# Patient Record
Sex: FEMALE | Race: WHITE | NOT HISPANIC OR LATINO | Employment: OTHER | URBAN - NONMETROPOLITAN AREA
[De-identification: names, ages, dates, MRNs, and addresses within clinical notes are randomized per-mention and may not be internally consistent; named-entity substitution may affect disease eponyms.]

---

## 2017-01-03 ENCOUNTER — TRANSCRIBE ORDERS (OUTPATIENT)
Dept: ADMINISTRATIVE | Facility: HOSPITAL | Age: 57
End: 2017-01-03

## 2017-01-03 DIAGNOSIS — Z12.31 VISIT FOR SCREENING MAMMOGRAM: Primary | ICD-10-CM

## 2017-01-24 ENCOUNTER — HOSPITAL ENCOUNTER (OUTPATIENT)
Dept: MAMMOGRAPHY | Facility: HOSPITAL | Age: 57
Discharge: HOME | End: 2017-01-24
Admitting: PHYSICIAN ASSISTANT

## 2017-01-24 DIAGNOSIS — Z12.31 VISIT FOR SCREENING MAMMOGRAM: ICD-10-CM

## 2017-01-24 PROCEDURE — 77063 BREAST TOMOSYNTHESIS BI: CPT | Performed by: RADIOLOGY

## 2017-01-24 PROCEDURE — G0202 SCR MAMMO BI INCL CAD: HCPCS

## 2017-01-24 PROCEDURE — 77063 BREAST TOMOSYNTHESIS BI: CPT

## 2017-01-24 PROCEDURE — G0202 SCR MAMMO BI INCL CAD: HCPCS | Performed by: RADIOLOGY

## 2017-04-06 RX ORDER — PANTOPRAZOLE SODIUM 40 MG/1
40 TABLET, DELAYED RELEASE ORAL DAILY
COMMUNITY

## 2017-04-06 RX ORDER — MULTIVIT WITH MINERALS/LUTEIN
250 TABLET ORAL DAILY
COMMUNITY

## 2017-04-06 RX ORDER — ERGOCALCIFEROL (VITAMIN D2) 10 MCG
400 TABLET ORAL DAILY
COMMUNITY

## 2017-04-10 ENCOUNTER — OFFICE VISIT (OUTPATIENT)
Dept: SURGERY | Facility: CLINIC | Age: 57
End: 2017-04-10

## 2017-04-10 VITALS
HEART RATE: 85 BPM | BODY MASS INDEX: 43.32 KG/M2 | WEIGHT: 276 LBS | DIASTOLIC BLOOD PRESSURE: 90 MMHG | SYSTOLIC BLOOD PRESSURE: 160 MMHG | HEIGHT: 67 IN

## 2017-04-10 DIAGNOSIS — R22.9 SUBCUTANEOUS MASS: Primary | ICD-10-CM

## 2017-04-10 PROCEDURE — 99202 OFFICE O/P NEW SF 15 MIN: CPT | Performed by: SURGERY

## 2017-04-10 NOTE — PROGRESS NOTES
"Subjective   Lauren Klein is a 56 y.o. female here today for two places on back of left leg referred by Mare Cool.    History of Present Illness  Ms Klein was seen in the office today for evaluation of 2 subcutaneous masses on the left lower leg.  There is a posterior one which has be present for at least 2 years.  The patient saw Dr. Patino for this in the past and he told her not to be concerned unless it start to grow.  There is a larger anterior medial mass which the patient states has grown fairly rapidly.  She states it hurts at times, especially at night. She had a CT of the lower extremity which did not demonstrate the masses at all.  No Known Allergies  Current Outpatient Prescriptions   Medication Sig Dispense Refill   • pantoprazole (PROTONIX) 40 MG EC tablet Take 40 mg by mouth Daily.     • vitamin C (ASCORBIC ACID) 250 MG tablet Take 250 mg by mouth Daily.     • Vitamin D, Cholecalciferol, (CHOLECALCIFEROL) 400 UNITS tablet Take 400 Units by mouth Daily.       No current facility-administered medications for this visit.      Past Medical History:   Diagnosis Date   • Colitis      Past Surgical History:   Procedure Laterality Date   • CHOLECYSTECTOMY     • HYSTERECTOMY      age 50   • TONSILLECTOMY       Review of Systems  General: negative  Integumentary: lump  Eyes: eyes itch  ENT: negative  Respiratory: negative  Gastrointestinal: negative  Cardiovascular: negative  Neurological: negative  Psychiatric: negative  Hematologic/Lymphatic: negative  Genitourinary: negative  Musculoskeletal: sore muscles  Endocrine: hot flashes  Breasts: negative        Objective   /90 (BP Location: Left arm, Patient Position: Sitting)  Pulse 85  Ht 67\" (170.2 cm)  Wt 276 lb (125 kg)  BMI 43.23 kg/m2  Physical Exam  On examination this is an overweight white female in no acute distress  HEENT examination: Within normal limits.  Conjunctiva pink.  Nose and ears appear normal.  Neck: Supple, full range of " motion.  No JVD.  Musculoskeletal: Full range of motion all extremities without focal weakness. Normal gait. No digital cyanosis.  Psych: Patient is alert, oriented x3. Mood and affect are appropriate.  Skin:  On inspection of the lower extremities there is asymmetry of the left lower leg with respect to the right.  In the mid posterior left lower leg there is a 4-5 cm mass consistent with a lipoma.  The anterior mass is visible anteriorly starts just medial to the most anterior surface of the tibia.  The inferior border is visible however the posterior lateral extent is not palpable at all.  The mass could be 15 cm but could be more or less.  The vertical distance is fairly well-defined is 9.5 cm.  Results/Data  CT report was reviewed and interestingly does not even describe any asymmetry.  Procedures       Assessment/Plan     Soft tissue mass left lower extremity, the borders of which are not clearly delineated on examination    Plan:  Will obtain CD disc from [facility] and review.               Discussion/Summary    Errors in dictation may reflect use of voice recognition software and not all errors in transcription may have been detected prior to signing.    Future Appointments  Date Time Provider Department Center   4/10/2017 2:20 PM Janine Menon MD [facility] None

## 2017-05-04 DIAGNOSIS — R22.42 MASS OF LEG, LEFT: Primary | ICD-10-CM

## 2017-05-15 ENCOUNTER — TELEPHONE (OUTPATIENT)
Dept: SURGERY | Facility: CLINIC | Age: 57
End: 2017-05-15

## 2017-05-16 ENCOUNTER — TELEPHONE (OUTPATIENT)
Dept: SURGERY | Facility: CLINIC | Age: 57
End: 2017-05-16

## 2017-05-24 ENCOUNTER — HOSPITAL ENCOUNTER (OUTPATIENT)
Dept: MRI IMAGING | Facility: HOSPITAL | Age: 57
Discharge: HOME | End: 2017-05-24
Attending: SURGERY | Admitting: SURGERY

## 2017-05-24 DIAGNOSIS — R22.42 MASS OF LEG, LEFT: ICD-10-CM

## 2017-05-24 PROCEDURE — 73718 MRI LOWER EXTREMITY W/O DYE: CPT

## 2017-05-24 PROCEDURE — 73718 MRI LOWER EXTREMITY W/O DYE: CPT | Performed by: RADIOLOGY

## 2017-05-25 ENCOUNTER — TELEPHONE (OUTPATIENT)
Dept: SURGERY | Facility: CLINIC | Age: 57
End: 2017-05-25

## 2017-06-21 ENCOUNTER — APPOINTMENT (OUTPATIENT)
Dept: GENERAL RADIOLOGY | Facility: HOSPITAL | Age: 57
End: 2017-06-21

## 2017-06-21 ENCOUNTER — HOSPITAL ENCOUNTER (EMERGENCY)
Facility: HOSPITAL | Age: 57
Discharge: HOME | End: 2017-06-21
Attending: EMERGENCY MEDICINE | Admitting: EMERGENCY MEDICINE

## 2017-06-21 VITALS
OXYGEN SATURATION: 98 % | TEMPERATURE: 98 F | SYSTOLIC BLOOD PRESSURE: 139 MMHG | WEIGHT: 250 LBS | HEART RATE: 68 BPM | RESPIRATION RATE: 16 BRPM | DIASTOLIC BLOOD PRESSURE: 85 MMHG | HEIGHT: 67 IN | BODY MASS INDEX: 39.24 KG/M2

## 2017-06-21 DIAGNOSIS — S50.01XA CONTUSION OF RIGHT ELBOW, INITIAL ENCOUNTER: Primary | ICD-10-CM

## 2017-06-21 DIAGNOSIS — S80.211A ABRASION, KNEE, RIGHT, INITIAL ENCOUNTER: ICD-10-CM

## 2017-06-21 DIAGNOSIS — S80.01XA CONTUSION OF RIGHT KNEE, INITIAL ENCOUNTER: ICD-10-CM

## 2017-06-21 PROCEDURE — 73080 X-RAY EXAM OF ELBOW: CPT

## 2017-06-21 PROCEDURE — 73562 X-RAY EXAM OF KNEE 3: CPT

## 2017-06-21 PROCEDURE — 73562 X-RAY EXAM OF KNEE 3: CPT | Performed by: RADIOLOGY

## 2017-06-21 PROCEDURE — 99283 EMERGENCY DEPT VISIT LOW MDM: CPT

## 2017-06-21 PROCEDURE — 73080 X-RAY EXAM OF ELBOW: CPT | Performed by: RADIOLOGY

## 2017-06-21 RX ORDER — CEPHALEXIN 250 MG/1
500 CAPSULE ORAL ONCE
Status: COMPLETED | OUTPATIENT
Start: 2017-06-21 | End: 2017-06-21

## 2017-06-21 RX ORDER — CYCLOBENZAPRINE HCL 10 MG
10 TABLET ORAL 2 TIMES DAILY PRN
Qty: 20 TABLET | Refills: 0 | Status: SHIPPED | OUTPATIENT
Start: 2017-06-21

## 2017-06-21 RX ORDER — HYDROCODONE BITARTRATE AND ACETAMINOPHEN 5; 325 MG/1; MG/1
1 TABLET ORAL ONCE
Status: COMPLETED | OUTPATIENT
Start: 2017-06-21 | End: 2017-06-21

## 2017-06-21 RX ORDER — IBUPROFEN 600 MG/1
600 TABLET ORAL EVERY 8 HOURS PRN
Qty: 30 TABLET | Refills: 0 | Status: SHIPPED | OUTPATIENT
Start: 2017-06-21

## 2017-06-21 RX ADMIN — HYDROCODONE BITARTRATE AND ACETAMINOPHEN 1 TABLET: 5; 325 TABLET ORAL at 21:28

## 2017-06-21 RX ADMIN — CEPHALEXIN 500 MG: 250 CAPSULE ORAL at 21:28

## 2017-06-22 NOTE — ED NOTES
R knee irrigated with NS flushes. Dressed with nonadherent and Kerlix fluff. Secured with tape strips      Shirin Arevalo RN  06/21/17 0688

## 2017-06-22 NOTE — ED PROVIDER NOTES
Subjective   Patient is a 56 y.o. female presenting with fall.   History provided by:  Patient  Fall   Mechanism of injury: fall    Injury location:  Shoulder/arm and leg  Shoulder/arm injury location:  R elbow  Leg injury location:  R knee  Incident location: local [place] office.  Time since incident:  8 hours  Arrived directly from scene: no    Fall:     Fall occurred:  Tripped    Impact surface:  [place] and gravel    Point of impact:  Knees  Tetanus status:  Unknown  Associated symptoms: no abdominal pain and no chest pain        Review of Systems   Constitutional: Negative.  Negative for fever.   HENT: Negative.    Respiratory: Negative.    Cardiovascular: Negative.  Negative for chest pain.   Gastrointestinal: Negative.  Negative for abdominal pain.   Endocrine: Negative.    Genitourinary: Negative.  Negative for dysuria.   Skin: Negative.    Neurological: Negative.    Psychiatric/Behavioral: Negative.    All other systems reviewed and are negative.      Past Medical History:   Diagnosis Date   • Colitis        No Known Allergies    Past Surgical History:   Procedure Laterality Date   • CHOLECYSTECTOMY     • HYSTERECTOMY      age 50   • TONSILLECTOMY         Family History   Problem Relation Age of Onset   • Hypertension Mother    • Hypertension Father    • Hypertension Brother    • Cancer Maternal Grandmother    • Heart disease Paternal Grandfather    • Breast cancer Neg Hx        Social History     Social History   • Marital status:      Spouse name: N/A   • Number of children: N/A   • Years of education: N/A     Social History Main Topics   • Smoking status: Former Smoker     Quit date: 1997   • Smokeless tobacco: Never Used   • Alcohol use No   • Drug use: No   • Sexual activity: Not Asked     Other Topics Concern   • None     Social History Narrative   • None           Objective   Physical Exam   Constitutional: She is oriented to person, place, and time. She appears well-developed and  well-nourished. No distress.   HENT:   Head: Normocephalic and atraumatic.   Nose: Nose normal.   Eyes: Conjunctivae and EOM are normal. Pupils are equal, round, and reactive to light.   Neck: Normal range of motion. Neck supple. No JVD present. No tracheal deviation present.   Cardiovascular: Normal rate, regular rhythm and normal heart sounds.    No murmur heard.  Pulmonary/Chest: Effort normal and breath sounds normal. No respiratory distress. She has no wheezes.   Abdominal: Soft. Bowel sounds are normal. There is no tenderness.   Musculoskeletal: She exhibits tenderness. She exhibits no edema or deformity.   Inferior to olecranon hematoma noted.    Neurological: She is alert and oriented to person, place, and time. No cranial nerve deficit.   Skin: Skin is warm and dry. No rash noted. She is not diaphoretic. No erythema. No pallor.   Abrasion noted to right knee.  Tender to palpation.    Psychiatric: She has a normal mood and affect. Her behavior is normal. Thought content normal.   Nursing note and vitals reviewed.      Procedures         ED Course  ED Course   Comment By Time   XR reviewed by Dr. Lazo:  No apparent acute bony abnormality identified. LISSETTE Olivares 06/21 3106                  [facility]  Number of Diagnoses or Management Options  Abrasion, knee, right, initial encounter: minor  Contusion of right elbow, initial encounter: new and requires workup  Contusion of right knee, initial encounter: new and requires workup     Amount and/or Complexity of Data Reviewed  Tests in the radiology section of CPT®: ordered and reviewed  Discuss the patient with other providers: yes    Risk of Complications, Morbidity, and/or Mortality  Presenting problems: low  Diagnostic procedures: low  Management options: low    Patient Progress  Patient progress: stable      Final diagnoses:   Contusion of right elbow, initial encounter   Contusion of right knee, initial encounter   Abrasion, knee, right, initial encounter             LISSETTE Olivares  06/21/17 2979

## 2018-01-29 ENCOUNTER — TRANSCRIBE ORDERS (OUTPATIENT)
Dept: ADMINISTRATIVE | Facility: HOSPITAL | Age: 58
End: 2018-01-29

## 2018-02-19 ENCOUNTER — HOSPITAL ENCOUNTER (OUTPATIENT)
Dept: MAMMOGRAPHY | Facility: HOSPITAL | Age: 58
Discharge: HOME | End: 2018-02-19
Admitting: NURSE PRACTITIONER

## 2018-02-19 DIAGNOSIS — Z12.31 SCREENING MAMMOGRAM, ENCOUNTER FOR: ICD-10-CM

## 2018-02-19 PROCEDURE — 77067 SCR MAMMO BI INCL CAD: CPT

## 2018-02-19 PROCEDURE — 77063 BREAST TOMOSYNTHESIS BI: CPT | Performed by: RADIOLOGY

## 2018-02-19 PROCEDURE — 77063 BREAST TOMOSYNTHESIS BI: CPT

## 2018-02-19 PROCEDURE — 77067 SCR MAMMO BI INCL CAD: CPT | Performed by: RADIOLOGY

## 2018-03-14 ENCOUNTER — HOSPITAL ENCOUNTER (OUTPATIENT)
Dept: ULTRASOUND IMAGING | Facility: HOSPITAL | Age: 58
Discharge: HOME | End: 2018-03-14
Admitting: RADIOLOGY

## 2018-03-14 DIAGNOSIS — R92.8 ABNORMAL MAMMOGRAM OF RIGHT BREAST: ICD-10-CM

## 2018-03-14 PROCEDURE — 76882 US LMTD JT/FCL EVL NVASC XTR: CPT

## 2018-03-14 PROCEDURE — 76882 US LMTD JT/FCL EVL NVASC XTR: CPT | Performed by: RADIOLOGY

## 2018-06-19 ENCOUNTER — HOSPITAL ENCOUNTER (OUTPATIENT)
Dept: ULTRASOUND IMAGING | Facility: HOSPITAL | Age: 58
Discharge: HOME | End: 2018-06-19
Admitting: PHYSICIAN ASSISTANT

## 2018-06-19 DIAGNOSIS — R59.0 AXILLARY ADENOPATHY: ICD-10-CM

## 2018-06-19 PROCEDURE — 76882 US LMTD JT/FCL EVL NVASC XTR: CPT | Performed by: RADIOLOGY

## 2018-06-19 PROCEDURE — 76882 US LMTD JT/FCL EVL NVASC XTR: CPT

## 2018-08-25 ENCOUNTER — HOSPITAL ENCOUNTER (EMERGENCY)
Facility: HOSPITAL | Age: 58
Discharge: HOME | End: 2018-08-25
Attending: EMERGENCY MEDICINE | Admitting: EMERGENCY MEDICINE

## 2018-08-25 VITALS
BODY MASS INDEX: 39.55 KG/M2 | OXYGEN SATURATION: 98 % | HEIGHT: 67 IN | HEART RATE: 92 BPM | RESPIRATION RATE: 16 BRPM | DIASTOLIC BLOOD PRESSURE: 92 MMHG | WEIGHT: 252 LBS | SYSTOLIC BLOOD PRESSURE: 140 MMHG | TEMPERATURE: 97.6 F

## 2018-08-25 DIAGNOSIS — T50.905A ADVERSE EFFECT OF DRUG, INITIAL ENCOUNTER: Primary | ICD-10-CM

## 2018-08-25 PROCEDURE — 25010000002 DIPHENHYDRAMINE PER 50 MG: Performed by: PHYSICIAN ASSISTANT

## 2018-08-25 PROCEDURE — 99283 EMERGENCY DEPT VISIT LOW MDM: CPT

## 2018-08-25 PROCEDURE — 25010000002 METHYLPREDNISOLONE PER 125 MG: Performed by: PHYSICIAN ASSISTANT

## 2018-08-25 PROCEDURE — 96375 TX/PRO/DX INJ NEW DRUG ADDON: CPT

## 2018-08-25 PROCEDURE — 96374 THER/PROPH/DIAG INJ IV PUSH: CPT

## 2018-08-25 RX ORDER — PREDNISONE 20 MG/1
20 TABLET ORAL DAILY
Qty: 7 TABLET | Refills: 0 | Status: SHIPPED | OUTPATIENT
Start: 2018-08-25

## 2018-08-25 RX ORDER — METHYLPREDNISOLONE SODIUM SUCCINATE 125 MG/2ML
80 INJECTION, POWDER, LYOPHILIZED, FOR SOLUTION INTRAMUSCULAR; INTRAVENOUS ONCE
Status: COMPLETED | OUTPATIENT
Start: 2018-08-25 | End: 2018-08-25

## 2018-08-25 RX ORDER — SODIUM CHLORIDE 0.9 % (FLUSH) 0.9 %
10 SYRINGE (ML) INJECTION AS NEEDED
Status: DISCONTINUED | OUTPATIENT
Start: 2018-08-25 | End: 2018-08-26 | Stop reason: HOSPADM

## 2018-08-25 RX ORDER — DIPHENHYDRAMINE HYDROCHLORIDE 50 MG/ML
25 INJECTION INTRAMUSCULAR; INTRAVENOUS ONCE
Status: COMPLETED | OUTPATIENT
Start: 2018-08-25 | End: 2018-08-25

## 2018-08-25 RX ADMIN — DIPHENHYDRAMINE HYDROCHLORIDE 25 MG: 50 INJECTION INTRAMUSCULAR; INTRAVENOUS at 21:51

## 2018-08-25 RX ADMIN — TRIAMCINOLONE ACETONIDE: 1 OINTMENT TOPICAL at 21:54

## 2018-08-25 RX ADMIN — METHYLPREDNISOLONE SODIUM SUCCINATE 80 MG: 125 INJECTION, POWDER, FOR SOLUTION INTRAMUSCULAR; INTRAVENOUS at 21:52

## 2018-09-20 ENCOUNTER — HOSPITAL ENCOUNTER (OUTPATIENT)
Dept: ULTRASOUND IMAGING | Facility: HOSPITAL | Age: 58
Discharge: HOME | End: 2018-09-20
Admitting: RADIOLOGY

## 2018-09-20 DIAGNOSIS — Z09 FOLLOW-UP EXAM, 3-6 MONTHS SINCE PREVIOUS EXAM: ICD-10-CM

## 2018-09-20 PROCEDURE — 76882 US LMTD JT/FCL EVL NVASC XTR: CPT

## 2018-09-20 PROCEDURE — 76882 US LMTD JT/FCL EVL NVASC XTR: CPT | Performed by: RADIOLOGY

## 2019-02-20 ENCOUNTER — HOSPITAL ENCOUNTER (OUTPATIENT)
Dept: MAMMOGRAPHY | Facility: HOSPITAL | Age: 59
Discharge: HOME | End: 2019-02-20
Admitting: NURSE PRACTITIONER

## 2019-02-20 DIAGNOSIS — Z12.39 SCREENING BREAST EXAMINATION: ICD-10-CM

## 2019-02-20 PROCEDURE — 77067 SCR MAMMO BI INCL CAD: CPT

## 2019-02-20 PROCEDURE — 77063 BREAST TOMOSYNTHESIS BI: CPT

## 2019-02-20 PROCEDURE — 77067 SCR MAMMO BI INCL CAD: CPT | Performed by: RADIOLOGY

## 2019-02-20 PROCEDURE — 77063 BREAST TOMOSYNTHESIS BI: CPT | Performed by: RADIOLOGY

## 2019-10-24 ENCOUNTER — HOSPITAL ENCOUNTER (EMERGENCY)
Facility: HOSPITAL | Age: 59
Discharge: HOME | End: 2019-10-24
Attending: EMERGENCY MEDICINE | Admitting: EMERGENCY MEDICINE

## 2019-10-24 ENCOUNTER — APPOINTMENT (OUTPATIENT)
Dept: GENERAL RADIOLOGY | Facility: HOSPITAL | Age: 59
End: 2019-10-24

## 2019-10-24 VITALS
RESPIRATION RATE: 16 BRPM | OXYGEN SATURATION: 98 % | HEIGHT: 67 IN | TEMPERATURE: 98 F | SYSTOLIC BLOOD PRESSURE: 154 MMHG | HEART RATE: 88 BPM | BODY MASS INDEX: 40.18 KG/M2 | WEIGHT: 256 LBS | DIASTOLIC BLOOD PRESSURE: 70 MMHG

## 2019-10-24 DIAGNOSIS — S61.011A LACERATION OF RIGHT THUMB WITHOUT FOREIGN BODY WITHOUT DAMAGE TO NAIL, INITIAL ENCOUNTER: Primary | ICD-10-CM

## 2019-10-24 PROCEDURE — 99283 EMERGENCY DEPT VISIT LOW MDM: CPT

## 2019-10-24 PROCEDURE — 25010000002 TDAP 5-2.5-18.5 LF-MCG/0.5 SUSPENSION: Performed by: EMERGENCY MEDICINE

## 2019-10-24 PROCEDURE — 90715 TDAP VACCINE 7 YRS/> IM: CPT | Performed by: EMERGENCY MEDICINE

## 2019-10-24 PROCEDURE — 90471 IMMUNIZATION ADMIN: CPT | Performed by: EMERGENCY MEDICINE

## 2019-10-24 PROCEDURE — 73140 X-RAY EXAM OF FINGER(S): CPT | Performed by: RADIOLOGY

## 2019-10-24 PROCEDURE — 73140 X-RAY EXAM OF FINGER(S): CPT

## 2019-10-24 RX ORDER — CEPHALEXIN 500 MG/1
500 CAPSULE ORAL 3 TIMES DAILY
Qty: 24 CAPSULE | Refills: 0 | Status: SHIPPED | OUTPATIENT
Start: 2019-10-24

## 2019-10-24 RX ORDER — LIDOCAINE HYDROCHLORIDE 10 MG/ML
10 INJECTION, SOLUTION EPIDURAL; INFILTRATION; INTRACAUDAL; PERINEURAL ONCE
Status: COMPLETED | OUTPATIENT
Start: 2019-10-24 | End: 2019-10-24

## 2019-10-24 RX ADMIN — LIDOCAINE HYDROCHLORIDE 10 ML: 10 INJECTION, SOLUTION EPIDURAL; INFILTRATION; INTRACAUDAL at 21:34

## 2019-10-24 RX ADMIN — TETANUS TOXOID, REDUCED DIPHTHERIA TOXOID AND ACELLULAR PERTUSSIS VACCINE, ADSORBED 0.5 ML: 5; 2.5; 8; 8; 2.5 SUSPENSION INTRAMUSCULAR at 21:48

## 2019-10-25 NOTE — ED PROVIDER NOTES
Subjective   Cut right thumb on piece of metal in shower.        Finger Laceration   Severity:  Mild  Onset quality:  Sudden  Duration:  1 hour  Chronicity:  New      Review of Systems   Constitutional: Negative.    HENT: Negative.    Eyes: Negative.    Respiratory: Negative.    Cardiovascular: Negative.    Gastrointestinal: Negative.    Endocrine: Negative.    Genitourinary: Negative.    Musculoskeletal: Negative.    Skin:        Laceration right thumb   Allergic/Immunologic: Negative.    Neurological: Negative.    Hematological: Negative.    Psychiatric/Behavioral: Negative.        Past Medical History:   Diagnosis Date   • Colitis        Allergies   Allergen Reactions   • Dilaudid [Hydromorphone] Swelling     SWELLING AND NAUSEA       Past Surgical History:   Procedure Laterality Date   • CHOLECYSTECTOMY     • HYSTERECTOMY      age 50   • TONSILLECTOMY         Family History   Problem Relation Age of Onset   • Hypertension Mother    • Hypertension Father    • Hypertension Brother    • Cancer Maternal Grandmother    • Heart disease Paternal Grandfather    • Breast cancer Neg Hx        Social History     Socioeconomic History   • Marital status:      Spouse name: Not on file   • Number of children: Not on file   • Years of education: Not on file   • Highest education level: Not on file   Tobacco Use   • Smoking status: Former Smoker     Last attempt to quit:      Years since quittin.8   • Smokeless tobacco: Never Used   Substance and Sexual Activity   • Alcohol use: No   • Drug use: No           Objective   Physical Exam   Constitutional: She appears well-developed.   HENT:   Head: Normocephalic.   Eyes: Pupils are equal, round, and reactive to light.   Neck: Normal range of motion.   Cardiovascular: Normal rate.   Pulmonary/Chest: Effort normal.   Abdominal: Soft.   Musculoskeletal: Normal range of motion.   Neurological: She is alert.   Skin:   1.5cm laceration right thumb   Psychiatric: She has a  normal mood and affect.   Nursing note and vitals reviewed.      Laceration Repair  Date/Time: 10/24/2019 9:44 PM  Performed by: Rubén Alvarez MD  Authorized by: Rubén Alvarez MD     Consent:     Consent obtained:  Verbal and written    Risks discussed:  Infection, pain, retained foreign body and need for additional repair  Anesthesia (see MAR for exact dosages):     Anesthesia method:  Local infiltration    Local anesthetic:  Lidocaine 1% w/o epi  Laceration details:     Location:  Finger    Finger location:  R thumb    Length (cm):  1.5    Depth (mm):  4  Repair type:     Repair type:  Simple  Pre-procedure details:     Preparation:  Patient was prepped and draped in usual sterile fashion  Exploration:     Contaminated: no    Treatment:     Area cleansed with:  Betadine    Amount of cleaning:  Standard    Irrigation solution:  Sterile saline    Visualized foreign bodies/material removed: no    Skin repair:     Repair method:  Sutures    Suture size:  4-0    Suture material:  Nylon  Approximation:     Approximation:  Loose    Vermilion border: well-aligned    Post-procedure details:     Dressing:  Sterile dressing               ED Course                  MDM    Final diagnoses:   Laceration of right thumb without foreign body without damage to nail, initial encounter              Rubén Alvarez MD  10/24/19 4533

## 2019-10-25 NOTE — ED NOTES
Pt presents to the ER with an approx 1cm laceration to right first finger on knuckle. Bleeding is controlled at this time and pt denies any pain, states the thumb feels numb. Dr. Alvarez at bedside at this time, three stitches placed, pt tolerated well.      Vivien Brandon, RN  10/25/19 6390

## 2020-03-03 ENCOUNTER — APPOINTMENT (OUTPATIENT)
Dept: MAMMOGRAPHY | Facility: HOSPITAL | Age: 60
End: 2020-03-03

## 2020-10-09 ENCOUNTER — HOSPITAL ENCOUNTER (OUTPATIENT)
Dept: MAMMOGRAPHY | Facility: HOSPITAL | Age: 60
Discharge: HOME | End: 2020-10-09
Admitting: NURSE PRACTITIONER

## 2020-10-09 DIAGNOSIS — Z12.31 VISIT FOR SCREENING MAMMOGRAM: ICD-10-CM

## 2020-10-09 PROCEDURE — 77067 SCR MAMMO BI INCL CAD: CPT

## 2020-10-09 PROCEDURE — 77063 BREAST TOMOSYNTHESIS BI: CPT | Performed by: RADIOLOGY

## 2020-10-09 PROCEDURE — 77063 BREAST TOMOSYNTHESIS BI: CPT

## 2020-10-09 PROCEDURE — 77067 SCR MAMMO BI INCL CAD: CPT | Performed by: RADIOLOGY

## 2021-05-27 ENCOUNTER — HOSPITAL ENCOUNTER (OUTPATIENT)
Dept: GENERAL RADIOLOGY | Facility: HOSPITAL | Age: 61
Discharge: HOME | End: 2021-05-27
Admitting: NURSE PRACTITIONER

## 2021-05-27 ENCOUNTER — TRANSCRIBE ORDERS (OUTPATIENT)
Dept: ADMINISTRATIVE | Facility: HOSPITAL | Age: 61
End: 2021-05-27

## 2021-05-27 DIAGNOSIS — M25.561 RIGHT KNEE PAIN, UNSPECIFIED CHRONICITY: Primary | ICD-10-CM

## 2021-05-27 PROCEDURE — 73564 X-RAY EXAM KNEE 4 OR MORE: CPT | Performed by: RADIOLOGY

## 2021-05-27 PROCEDURE — 73562 X-RAY EXAM OF KNEE 3: CPT

## 2021-12-28 ENCOUNTER — HOSPITAL ENCOUNTER (OUTPATIENT)
Dept: MAMMOGRAPHY | Facility: HOSPITAL | Age: 61
Discharge: HOME | End: 2021-12-28
Admitting: NURSE PRACTITIONER

## 2021-12-28 DIAGNOSIS — Z12.31 VISIT FOR SCREENING MAMMOGRAM: ICD-10-CM

## 2021-12-28 PROCEDURE — 77063 BREAST TOMOSYNTHESIS BI: CPT | Performed by: RADIOLOGY

## 2021-12-28 PROCEDURE — 77063 BREAST TOMOSYNTHESIS BI: CPT

## 2021-12-28 PROCEDURE — 77067 SCR MAMMO BI INCL CAD: CPT

## 2021-12-28 PROCEDURE — 77067 SCR MAMMO BI INCL CAD: CPT | Performed by: RADIOLOGY

## 2022-05-13 ENCOUNTER — TRANSCRIBE ORDERS (OUTPATIENT)
Dept: ADMINISTRATIVE | Facility: HOSPITAL | Age: 62
End: 2022-05-13

## 2022-05-13 ENCOUNTER — HOSPITAL ENCOUNTER (OUTPATIENT)
Dept: RESPIRATORY THERAPY | Facility: HOSPITAL | Age: 62
Discharge: HOME | End: 2022-05-13
Admitting: NURSE PRACTITIONER

## 2022-05-13 DIAGNOSIS — R07.9 CHEST PAIN, UNSPECIFIED TYPE: Primary | ICD-10-CM

## 2022-05-13 DIAGNOSIS — R07.9 CHEST PAIN, UNSPECIFIED TYPE: ICD-10-CM

## 2022-05-13 LAB
QT INTERVAL: 388 MS
QTC INTERVAL: 461 MS

## 2022-05-13 PROCEDURE — 93005 ELECTROCARDIOGRAM TRACING: CPT | Performed by: NURSE PRACTITIONER

## 2022-05-18 ENCOUNTER — HOSPITAL ENCOUNTER (OUTPATIENT)
Dept: CARDIOLOGY | Facility: HOSPITAL | Age: 62
Discharge: HOME | End: 2022-05-18
Admitting: NURSE PRACTITIONER

## 2022-05-18 DIAGNOSIS — R07.9 CHEST PAIN, UNSPECIFIED TYPE: ICD-10-CM

## 2022-05-18 PROCEDURE — 93018 CV STRESS TEST I&R ONLY: CPT | Performed by: INTERNAL MEDICINE

## 2022-05-18 PROCEDURE — 93017 CV STRESS TEST TRACING ONLY: CPT

## 2022-05-19 LAB
BH CV STRESS BP STAGE 1: NORMAL
BH CV STRESS BP STAGE 2: NORMAL
BH CV STRESS DURATION MIN STAGE 1: 3
BH CV STRESS DURATION MIN STAGE 2: 3
BH CV STRESS DURATION SEC STAGE 1: 0
BH CV STRESS DURATION SEC STAGE 2: 0
BH CV STRESS GRADE STAGE 1: 10
BH CV STRESS GRADE STAGE 2: 12
BH CV STRESS HR STAGE 1: 129
BH CV STRESS HR STAGE 2: 142
BH CV STRESS METS STAGE 1: 5
BH CV STRESS METS STAGE 2: 7.5
BH CV STRESS PROTOCOL 1: NORMAL
BH CV STRESS RECOVERY BP: NORMAL MMHG
BH CV STRESS RECOVERY HR: 92 BPM
BH CV STRESS SPEED STAGE 1: 1.7
BH CV STRESS SPEED STAGE 2: 2.5
BH CV STRESS STAGE 1: 1
BH CV STRESS STAGE 2: 2
MAXIMAL PREDICTED HEART RATE: 161 BPM
PERCENT MAX PREDICTED HR: 80.12 %
STRESS BASELINE BP: NORMAL MMHG
STRESS BASELINE HR: 90 BPM
STRESS PERCENT HR: 94 %
STRESS POST ESTIMATED WORKLOAD: 7 METS
STRESS POST EXERCISE DUR MIN: 4 MIN
STRESS POST EXERCISE DUR SEC: 18 SEC
STRESS POST PEAK BP: NORMAL MMHG
STRESS POST PEAK HR: 129 BPM
STRESS TARGET HR: 137 BPM

## 2022-06-01 ENCOUNTER — TRANSCRIBE ORDERS (OUTPATIENT)
Dept: ADMINISTRATIVE | Facility: HOSPITAL | Age: 62
End: 2022-06-01

## 2022-06-01 DIAGNOSIS — R07.9 CHEST PAIN, UNSPECIFIED TYPE: Primary | ICD-10-CM

## 2022-06-03 ENCOUNTER — APPOINTMENT (OUTPATIENT)
Dept: CARDIOLOGY | Facility: HOSPITAL | Age: 62
End: 2022-06-03

## 2022-06-13 ENCOUNTER — HOSPITAL ENCOUNTER (OUTPATIENT)
Dept: CARDIOLOGY | Facility: HOSPITAL | Age: 62
Discharge: HOME | End: 2022-06-13
Admitting: NURSE PRACTITIONER

## 2022-06-13 DIAGNOSIS — R07.9 CHEST PAIN, UNSPECIFIED TYPE: ICD-10-CM

## 2022-06-13 PROCEDURE — 93306 TTE W/DOPPLER COMPLETE: CPT | Performed by: INTERNAL MEDICINE

## 2022-06-13 PROCEDURE — 93306 TTE W/DOPPLER COMPLETE: CPT

## 2022-06-15 LAB
BH CV ECHO MEAS - ACS: 2.2 CM
BH CV ECHO MEAS - AO MAX PG: 8.9 MMHG
BH CV ECHO MEAS - AO MEAN PG: 4 MMHG
BH CV ECHO MEAS - AO ROOT DIAM: 2.6 CM
BH CV ECHO MEAS - AO V2 MAX: 149 CM/SEC
BH CV ECHO MEAS - AO V2 VTI: 30 CM
BH CV ECHO MEAS - EDV(CUBED): 130.3 ML
BH CV ECHO MEAS - EDV(MOD-SP4): 59.1 ML
BH CV ECHO MEAS - EF(MOD-SP4): 64 %
BH CV ECHO MEAS - ESV(CUBED): 36.9 ML
BH CV ECHO MEAS - ESV(MOD-SP4): 21.3 ML
BH CV ECHO MEAS - FS: 34.3 %
BH CV ECHO MEAS - IVS/LVPW: 0.94 CM
BH CV ECHO MEAS - IVSD: 1.11 CM
BH CV ECHO MEAS - LA DIMENSION: 3.3 CM
BH CV ECHO MEAS - LAT PEAK E' VEL: 8.8 CM/SEC
BH CV ECHO MEAS - LV DIASTOLIC VOL/BSA (35-75): 26.3 CM2
BH CV ECHO MEAS - LV MASS(C)D: 223.9 GRAMS
BH CV ECHO MEAS - LV SYSTOLIC VOL/BSA (12-30): 9.5 CM2
BH CV ECHO MEAS - LVIDD: 5.1 CM
BH CV ECHO MEAS - LVIDS: 3.3 CM
BH CV ECHO MEAS - LVOT AREA: 2.5 CM2
BH CV ECHO MEAS - LVOT DIAM: 1.8 CM
BH CV ECHO MEAS - LVPWD: 1.18 CM
BH CV ECHO MEAS - MED PEAK E' VEL: 9.7 CM/SEC
BH CV ECHO MEAS - MV A MAX VEL: 90.6 CM/SEC
BH CV ECHO MEAS - MV E MAX VEL: 69.2 CM/SEC
BH CV ECHO MEAS - MV E/A: 0.76
BH CV ECHO MEAS - PA ACC TIME: 0.09 SEC
BH CV ECHO MEAS - PA PR(ACCEL): 37.6 MMHG
BH CV ECHO MEAS - SI(MOD-SP4): 16.8 ML/M2
BH CV ECHO MEAS - SV(MOD-SP4): 37.8 ML
BH CV ECHO MEAS - TAPSE (>1.6): 2.7 CM
BH CV ECHO MEASUREMENTS AVERAGE E/E' RATIO: 7.48
LEFT ATRIUM VOLUME INDEX: 16.8 ML/M2
MAXIMAL PREDICTED HEART RATE: 161 BPM
STRESS TARGET HR: 137 BPM

## 2023-01-06 ENCOUNTER — HOSPITAL ENCOUNTER (OUTPATIENT)
Dept: MAMMOGRAPHY | Facility: HOSPITAL | Age: 63
Discharge: HOME | End: 2023-01-06
Admitting: NURSE PRACTITIONER
Payer: COMMERCIAL

## 2023-01-06 DIAGNOSIS — Z12.31 VISIT FOR SCREENING MAMMOGRAM: ICD-10-CM

## 2023-01-06 PROCEDURE — 77067 SCR MAMMO BI INCL CAD: CPT | Performed by: RADIOLOGY

## 2023-01-06 PROCEDURE — 77063 BREAST TOMOSYNTHESIS BI: CPT | Performed by: RADIOLOGY

## 2023-01-06 PROCEDURE — 77067 SCR MAMMO BI INCL CAD: CPT

## 2023-01-06 PROCEDURE — 77063 BREAST TOMOSYNTHESIS BI: CPT

## 2023-01-09 ENCOUNTER — TRANSCRIBE ORDERS (OUTPATIENT)
Dept: ADMINISTRATIVE | Facility: HOSPITAL | Age: 63
End: 2023-01-09
Payer: COMMERCIAL

## 2023-01-09 ENCOUNTER — HOSPITAL ENCOUNTER (OUTPATIENT)
Dept: GENERAL RADIOLOGY | Facility: HOSPITAL | Age: 63
Discharge: HOME | End: 2023-01-09
Payer: COMMERCIAL

## 2023-01-09 DIAGNOSIS — M54.59 OTHER LOW BACK PAIN: ICD-10-CM

## 2023-01-09 DIAGNOSIS — R07.9 CHEST PAIN, UNSPECIFIED TYPE: ICD-10-CM

## 2023-01-09 DIAGNOSIS — R07.9 CHEST PAIN, UNSPECIFIED TYPE: Primary | ICD-10-CM

## 2023-01-09 PROCEDURE — 71046 X-RAY EXAM CHEST 2 VIEWS: CPT

## 2023-01-09 PROCEDURE — 72072 X-RAY EXAM THORAC SPINE 3VWS: CPT | Performed by: RADIOLOGY

## 2023-01-09 PROCEDURE — 72072 X-RAY EXAM THORAC SPINE 3VWS: CPT

## 2023-01-09 PROCEDURE — 71046 X-RAY EXAM CHEST 2 VIEWS: CPT | Performed by: RADIOLOGY

## 2023-11-16 ENCOUNTER — TRANSCRIBE ORDERS (OUTPATIENT)
Dept: ADMINISTRATIVE | Facility: HOSPITAL | Age: 63
End: 2023-11-16
Payer: COMMERCIAL

## 2023-11-16 DIAGNOSIS — Z12.31 VISIT FOR SCREENING MAMMOGRAM: Primary | ICD-10-CM

## 2024-01-08 ENCOUNTER — HOSPITAL ENCOUNTER (OUTPATIENT)
Dept: MAMMOGRAPHY | Facility: HOSPITAL | Age: 64
Discharge: HOME | End: 2024-01-08
Admitting: NURSE PRACTITIONER
Payer: COMMERCIAL

## 2024-01-08 DIAGNOSIS — Z12.31 VISIT FOR SCREENING MAMMOGRAM: ICD-10-CM

## 2024-01-08 PROCEDURE — 77067 SCR MAMMO BI INCL CAD: CPT | Performed by: RADIOLOGY

## 2024-01-08 PROCEDURE — 77063 BREAST TOMOSYNTHESIS BI: CPT

## 2024-01-08 PROCEDURE — 77063 BREAST TOMOSYNTHESIS BI: CPT | Performed by: RADIOLOGY

## 2024-01-08 PROCEDURE — 77067 SCR MAMMO BI INCL CAD: CPT

## 2024-04-18 ENCOUNTER — HOSPITAL ENCOUNTER (OUTPATIENT)
Dept: GENERAL RADIOLOGY | Facility: HOSPITAL | Age: 64
Discharge: HOME | End: 2024-04-18
Admitting: NURSE PRACTITIONER
Payer: MEDICARE

## 2024-04-18 ENCOUNTER — TRANSCRIBE ORDERS (OUTPATIENT)
Dept: ADMINISTRATIVE | Facility: HOSPITAL | Age: 64
End: 2024-04-18
Payer: COMMERCIAL

## 2024-04-18 DIAGNOSIS — J20.0 ACUTE BRONCHITIS DUE TO MYCOPLASMA PNEUMONIAE: ICD-10-CM

## 2024-04-18 DIAGNOSIS — R05.9 COUGH, UNSPECIFIED TYPE: Primary | ICD-10-CM

## 2024-04-18 DIAGNOSIS — R05.9 COUGH, UNSPECIFIED TYPE: ICD-10-CM

## 2024-04-18 PROCEDURE — 71046 X-RAY EXAM CHEST 2 VIEWS: CPT

## 2024-04-19 ENCOUNTER — TRANSCRIBE ORDERS (OUTPATIENT)
Dept: ADMINISTRATIVE | Facility: HOSPITAL | Age: 64
End: 2024-04-19
Payer: COMMERCIAL

## 2024-04-19 DIAGNOSIS — R91.1 LUNG NODULE: Primary | ICD-10-CM

## 2024-04-25 ENCOUNTER — HOSPITAL ENCOUNTER (OUTPATIENT)
Facility: HOSPITAL | Age: 64
Discharge: HOME | End: 2024-04-25
Admitting: NURSE PRACTITIONER
Payer: MEDICARE

## 2024-04-25 DIAGNOSIS — R91.1 LUNG NODULE: ICD-10-CM

## 2024-04-25 LAB — CREAT BLDA-MCNC: 0.6 MG/DL (ref 0.6–1.3)

## 2024-04-25 PROCEDURE — 82565 ASSAY OF CREATININE: CPT

## 2024-04-25 PROCEDURE — 25510000001 IOPAMIDOL 61 % SOLUTION: Performed by: NURSE PRACTITIONER

## 2024-04-25 PROCEDURE — 71260 CT THORAX DX C+: CPT

## 2024-04-25 PROCEDURE — 71260 CT THORAX DX C+: CPT | Performed by: RADIOLOGY

## 2024-04-25 RX ADMIN — IOPAMIDOL 100 ML: 612 INJECTION, SOLUTION INTRAVENOUS at 09:35

## 2024-06-12 ENCOUNTER — LAB (OUTPATIENT)
Dept: LAB | Facility: HOSPITAL | Age: 64
End: 2024-06-12
Payer: COMMERCIAL

## 2024-06-12 ENCOUNTER — OFFICE VISIT (OUTPATIENT)
Dept: PULMONOLOGY | Facility: CLINIC | Age: 64
End: 2024-06-12
Payer: COMMERCIAL

## 2024-06-12 VITALS
DIASTOLIC BLOOD PRESSURE: 82 MMHG | HEART RATE: 102 BPM | SYSTOLIC BLOOD PRESSURE: 142 MMHG | BODY MASS INDEX: 40.18 KG/M2 | TEMPERATURE: 97.3 F | HEIGHT: 67 IN | OXYGEN SATURATION: 96 % | WEIGHT: 256 LBS

## 2024-06-12 DIAGNOSIS — R91.1 PULMONARY NODULE: Primary | ICD-10-CM

## 2024-06-12 PROCEDURE — 99203 OFFICE O/P NEW LOW 30 MIN: CPT | Performed by: INTERNAL MEDICINE

## 2024-06-12 PROCEDURE — 36415 COLL VENOUS BLD VENIPUNCTURE: CPT | Performed by: INTERNAL MEDICINE

## 2024-06-12 PROCEDURE — 86431 RHEUMATOID FACTOR QUANT: CPT | Performed by: INTERNAL MEDICINE

## 2024-06-12 RX ORDER — LOSARTAN POTASSIUM 25 MG/1
25 TABLET ORAL DAILY
COMMUNITY

## 2024-06-12 NOTE — PROGRESS NOTES
Chief Complaint  Pulmonary Nodule    Lauren Klein is a 63 y.o. female who presents today to [facility] for Pulmonary Nodule.    HPI:   Patient is a 63-year-old  female who is referred to me for further evaluation of abnormal CAT scan which was done back in April when she had symptoms of cough sputum production and was treated for pneumonia.  Symptoms lasted for 2 weeks.  Patient had a CAT scan of the chest which showed groundglass nodule which was about 7 mm in the left upper lobe.  Patient reported that she felt better after got treated for pneumonia.    She denies cough, shortness of breath, fever or unintentional weight loss.    She reported unrefreshing sleep but denies significant this  snoring and morning headaches.  Patient reported 20 stiffness especially in the morning.  Denies rash.  Denies sick contact, exposure to birds and recent travel.        Previous History:   Past Medical History:   Diagnosis Date    Colitis     GERD (gastroesophageal reflux disease)     Hypertension       Past Surgical History:   Procedure Laterality Date    CHOLECYSTECTOMY      HYSTERECTOMY      age 50    TONSILLECTOMY        Social History     Socioeconomic History    Marital status:    Tobacco Use    Smoking status: Former     Current packs/day: 0.00     Types: Cigarettes     Quit date:      Years since quittin.4     Passive exposure: Past    Smokeless tobacco: Never    Tobacco comments:     Smoked on and off very little short period of time years ago at least 35 years ago   Vaping Use    Vaping status: Never Used   Substance and Sexual Activity    Alcohol use: No    Drug use: No    Sexual activity: Yes     Partners: Male     Birth control/protection: Vasectomy, Hysterectomy        Current Medications:  Current Outpatient Medications   Medication Sig Dispense Refill    losartan (COZAAR) 25 MG tablet Take 1 tablet by mouth Daily.      cephalexin  "(KEFLEX) 500 MG capsule Take 1 capsule by mouth 3 (Three) Times a Day. 24 capsule 0    cyclobenzaprine (FLEXERIL) 10 MG tablet Take 1 tablet by mouth 2 (Two) Times a Day As Needed for Muscle Spasms. 20 tablet 0    ibuprofen (ADVIL,MOTRIN) 600 MG tablet Take 1 tablet by mouth Every 8 (Eight) Hours As Needed for Mild Pain (1-3). 30 tablet 0    pantoprazole (PROTONIX) 40 MG EC tablet Take 40 mg by mouth Daily.      predniSONE (DELTASONE) 20 MG tablet Take 1 tablet by mouth Daily. 7 tablet 0    vitamin C (ASCORBIC ACID) 250 MG tablet Take 250 mg by mouth Daily.      Vitamin D, Cholecalciferol, (CHOLECALCIFEROL) 400 UNITS tablet Take 400 Units by mouth Daily.       No current facility-administered medications for this visit.       Allergies:   Allergies   Allergen Reactions    Dilaudid [Hydromorphone] Swelling     SWELLING AND NAUSEA       Vitals:   /82   Pulse 102   Temp 97.3 °F (36.3 °C) (Temporal)   Ht 170.2 cm (67\")   Wt 116 kg (256 lb)   SpO2 96%   BMI 40.10 kg/m²     Estimated body mass index is 40.1 kg/m² as calculated from the following:    Height as of this encounter: 170.2 cm (67\").    Weight as of this encounter: 116 kg (256 lb).               Physical Exam:   Physical Exam  Vitals reviewed.   Constitutional:       Appearance: Normal appearance. She is obese.      Interventions: She is not intubated.  HENT:      Head: Normocephalic.      Nose: Nose normal.      Mouth/Throat:      Mouth: Mucous membranes are moist.   Eyes:      Extraocular Movements: Extraocular movements intact.      Conjunctiva/sclera: Conjunctivae normal.      Pupils: Pupils are equal, round, and reactive to light.   Cardiovascular:      Rate and Rhythm: Normal rate.      Heart sounds: No murmur heard.     No friction rub. No gallop.   Pulmonary:      Effort: Pulmonary effort is normal. No tachypnea, bradypnea, accessory muscle usage, prolonged expiration, respiratory distress or retractions. She is not intubated.      Breath " "sounds: Normal breath sounds. No stridor, decreased air movement or transmitted upper airway sounds.   Abdominal:      General: There is no distension.      Palpations: Abdomen is soft. There is no mass.      Tenderness: There is no abdominal tenderness. There is no right CVA tenderness, left CVA tenderness, guarding or rebound.      Hernia: No hernia is present.   Skin:     Coloration: Skin is not jaundiced.      Findings: No erythema.   Neurological:      General: No focal deficit present.      Mental Status: She is alert and oriented to person, place, and time.   Psychiatric:         Mood and Affect: Mood normal.        Procedures     STOP-Bang Score:     [place] Sleepiness Scale:       Lab Results:   Hospital Outpatient Visit on 04/25/2024   Component Date Value Ref Range Status    Creatinine 04/25/2024 0.60  0.60 - 1.30 mg/dL Final    Serial Number: [id number]:  [id number]       Lab Results (last 72 hours)       ** No results found for the last 72 hours. **          WBC No results found for: \"WBC\"   HGB No results found for: \"HGB\"   HCT No results found for: \"HCT\"   Platlets No results found for: \"LABPLAT\"     PT/INR:  No results found for: \"PROTIME\"/No results found for: \"INR\"    Sodium No results found for: \"NA\"   Potassium No results found for: \"K\"   Chloride No results found for: \"CL\"   Bicarbonate No results found for: \"PLASMABICARB\"   BUN No results found for: \"BUN\"   Creatinine No results found for: \"CREATININE\"   Calcium No results found for: \"CALCIUM\"   Magnesium No results found for: \"MG\"     pH No results found for: \"PHART\"   pO2 No results found for: \"PO2ART\"   pCO2 No results found for: \"ECI0XQT\"   HCO3 No results found for: \"FWY5KTH\"           Radiology Review:   No results found for this or any previous visit.     No results found for this or any previous visit.    No results found for this or any previous visit.    No results found for this or any previous visit.    Results for orders placed " during the hospital encounter of 04/25/24    CT Chest With Contrast Diagnostic    Narrative  EXAM: CT CHEST W CONTRAST DIAGNOSTIC-    CLINICAL INDICATION:R91.1; R91.1-Solitary pulmonary nodule    COMPARISON: None immediately available.    TECHNIQUE: Multiple axial CT images were obtained from lung apex through  upper abdomen WITH administration of IV contrast. Reformatted images in  the coronal and/or sagittal plane(s) were generated from the axial data  set to facilitate diagnostic accuracy and/or surgical planning.    Radiation dose reduction techniques were utilized per ALARA protocol.  Automated exposure control was initiated through either or [employer] or  [employer] software packages by  protocol.  DOSE (DLP mGy-cm):      FINDINGS:    LUNGS: Vague 7 mm groundglass nodular density in the left lung image 33  of axial series 2. No focal solid nodules are identified.    HEART: Unremarkable.    MEDIASTINUM: No masses. No enlarged lymph nodes.  No fluid collections.    PLEURA: No pleural effusion. No pleural mass or abnormal calcification.  No pneumothorax.    VASCULATURE: No evidence of aneurysm.    BONES: No acute bony abnormality.    VISUALIZED UPPER ABDOMEN:The upper abdomen is unremarkable as  visualized.    Other: None.    Impression  1. Groundglass nodule in the left upper lobe  2. No solid nodule, adenopathy, or effusions        This report was finalized on 4/25/2024 10:07 AM by Dr. Madhu Burrows MD.    No results found for this or any previous visit.     No results found for this or any previous visit.    No results found for this or any previous visit.    No results found for this or any previous visit.                  Results Review: I reviewed the patient's new clinical results.    Assessment and Plan    Visit Diagnosis:     ICD-10-CM ICD-9-CM   1. Pulmonary nodule  R91.1 793.11   7 mm groundglass opacity in the left upper lobe most likely related to pneumonia.  Differential include early  stage of interstitial lung disease, question of alveolar cell carcinoma but less likely.  Would like to repeat the CAT scan in 6 months.  I would also like to check rheumatoid factor.  Return to clinic after CAT scan, earlier as needed    New Medications:   No orders of the defined types were placed in this encounter.      Discontinued Medications:   There are no discontinued medications.         Follow Up:       Patient was given instructions and counseling regarding her condition. Please see specific information pulled into the AVS if appropriate.       This document has been electronically signed by Tylor [place] MD   June 12, 2024 15:26 EDT    Dictated Utilizing Dragon Dictation: Part of this note may be an electronic transcription/translation of spoken language to printed text using the Dragon Dictation System.

## 2024-06-13 LAB — CHROMATIN AB SERPL-ACNC: <10 IU/ML (ref 0–14)

## 2024-09-20 ENCOUNTER — HOSPITAL ENCOUNTER (OUTPATIENT)
Dept: GENERAL RADIOLOGY | Facility: HOSPITAL | Age: 64
Discharge: HOME | End: 2024-09-20
Payer: COMMERCIAL

## 2024-09-20 ENCOUNTER — TRANSCRIBE ORDERS (OUTPATIENT)
Dept: ADMINISTRATIVE | Facility: HOSPITAL | Age: 64
End: 2024-09-20
Payer: COMMERCIAL

## 2024-09-20 DIAGNOSIS — M79.674 PAIN IN TOE OF RIGHT FOOT: ICD-10-CM

## 2024-09-20 DIAGNOSIS — M79.674 PAIN IN TOE OF RIGHT FOOT: Primary | ICD-10-CM

## 2024-09-20 PROCEDURE — 73630 X-RAY EXAM OF FOOT: CPT

## 2024-09-25 ENCOUNTER — TELEPHONE (OUTPATIENT)
Dept: GASTROENTEROLOGY | Facility: CLINIC | Age: 64
End: 2024-09-25

## 2024-09-25 ENCOUNTER — HOSPITAL ENCOUNTER (OUTPATIENT)
Dept: GENERAL RADIOLOGY | Facility: HOSPITAL | Age: 64
Discharge: HOME | End: 2024-09-25
Admitting: NURSE PRACTITIONER
Payer: MEDICARE

## 2024-09-25 ENCOUNTER — OFFICE VISIT (OUTPATIENT)
Dept: GASTROENTEROLOGY | Facility: CLINIC | Age: 64
End: 2024-09-25
Payer: COMMERCIAL

## 2024-09-25 VITALS
HEIGHT: 67 IN | DIASTOLIC BLOOD PRESSURE: 81 MMHG | HEART RATE: 80 BPM | BODY MASS INDEX: 41.91 KG/M2 | WEIGHT: 267 LBS | SYSTOLIC BLOOD PRESSURE: 139 MMHG

## 2024-09-25 DIAGNOSIS — N20.0 LEFT NEPHROLITHIASIS: ICD-10-CM

## 2024-09-25 DIAGNOSIS — R13.19 ESOPHAGEAL DYSPHAGIA: ICD-10-CM

## 2024-09-25 DIAGNOSIS — Z86.0100 PERSONAL HISTORY OF COLONIC POLYPS: Primary | ICD-10-CM

## 2024-09-25 DIAGNOSIS — R19.7 DIARRHEA, UNSPECIFIED TYPE: ICD-10-CM

## 2024-09-25 DIAGNOSIS — K21.9 GASTROESOPHAGEAL REFLUX DISEASE, UNSPECIFIED WHETHER ESOPHAGITIS PRESENT: ICD-10-CM

## 2024-09-25 DIAGNOSIS — R10.84 GENERALIZED ABDOMINAL PAIN: ICD-10-CM

## 2024-09-25 DIAGNOSIS — K92.1 HEMATOCHEZIA: ICD-10-CM

## 2024-09-25 DIAGNOSIS — Z12.11 ENCOUNTER FOR SCREENING FOR MALIGNANT NEOPLASM OF COLON: ICD-10-CM

## 2024-09-25 DIAGNOSIS — K59.04 CHRONIC IDIOPATHIC CONSTIPATION: ICD-10-CM

## 2024-09-25 PROCEDURE — 74018 RADEX ABDOMEN 1 VIEW: CPT

## 2024-09-25 RX ORDER — POLYETHYLENE GLYCOL 3350 17 G/17G
510 POWDER, FOR SOLUTION ORAL TAKE AS DIRECTED
Qty: 510 G | Refills: 0 | Status: SHIPPED | OUTPATIENT
Start: 2024-09-25

## 2024-09-25 RX ORDER — CALCIUM POLYCARBOPHIL 625 MG
TABLET ORAL
Qty: 60 TABLET | Refills: 3 | Status: SHIPPED | OUTPATIENT
Start: 2024-09-25

## 2024-09-25 RX ORDER — BISACODYL 5 MG/1
20 TABLET, DELAYED RELEASE ORAL ONCE
Qty: 4 TABLET | Refills: 0 | Status: SHIPPED | OUTPATIENT
Start: 2024-09-25 | End: 2024-09-25

## 2024-09-25 NOTE — TELEPHONE ENCOUNTER
Hub staff attempted to follow warm transfer process and was unsuccessful     Caller: Lauren Klein    Relationship to patient: Self    Best call back number: [phone]    Patient is needing: PATIENT CALLED IN AND STATED THAT SHE WAS AT HER PHARMACY AND WAS TOLD THAT THEY DID NOT RECEIVE ORDERS FOR ANY OF HER MEDICATION. VERIFIED PHARMACY ON FILE AND INFORMED PATIENT THAT PHARMACY CONFIRMED RECEIPT OF DULCOLAX, MIRALAX, & FIBERCON. PATIENT STATED THAT SHE WILL CHECK BACK WITH THEM.    PATIENT WOULD ALSO LIKE TO KNOW IF SHE IS TO START THE FIBERCON NOW, OR IS SHE TO HOLD OFF ON STARTING THAT UNTIL SHE STARTS THE PREP FOR HER UPCOMING PROCEDURE.    PATIENT WOULD ALSO LIKE TO KNOW IF SHE IS SUPPOSED TO START PROTONIX. PATIENT STATED THAT ON HER VISIT TODAY, AFTER SPEAKING WITH BREEZY, SHE WAS UNDER THE IMPRESSION THAT SHE WOULD BE STARTING THAT. PLEASE CALL BACK AS SOON AS POSSIBLE TO ADVISE. OKAY TO CALL ANYTIME, OKAY TO LEAVE VM.

## 2024-09-26 ENCOUNTER — PATIENT ROUNDING (BHMG ONLY) (OUTPATIENT)
Dept: GASTROENTEROLOGY | Facility: CLINIC | Age: 64
End: 2024-09-26
Payer: COMMERCIAL

## 2024-09-27 NOTE — TELEPHONE ENCOUNTER
I called patient, no answer, I left a message on her VM that per Delores, Yes, I would like her to start fibercon and protonix now as we discussed in clinic.

## 2024-09-30 ENCOUNTER — TELEPHONE (OUTPATIENT)
Dept: GASTROENTEROLOGY | Facility: CLINIC | Age: 64
End: 2024-09-30
Payer: COMMERCIAL

## 2024-09-30 NOTE — TELEPHONE ENCOUNTER
Patient called stating that she was supposed to have Pantoprazole called in but I don't see that the RX was sent in. Thank you!

## 2024-10-01 DIAGNOSIS — K21.9 GASTROESOPHAGEAL REFLUX DISEASE, UNSPECIFIED WHETHER ESOPHAGITIS PRESENT: Primary | ICD-10-CM

## 2024-10-01 RX ORDER — PANTOPRAZOLE SODIUM 40 MG/1
40 TABLET, DELAYED RELEASE ORAL DAILY
Qty: 30 TABLET | Refills: 3 | Status: SHIPPED | OUTPATIENT
Start: 2024-10-01

## 2024-10-17 PROBLEM — K59.04 CHRONIC IDIOPATHIC CONSTIPATION: Status: ACTIVE | Noted: 2024-09-25

## 2024-10-17 PROBLEM — Z12.11 ENCOUNTER FOR SCREENING FOR MALIGNANT NEOPLASM OF COLON: Status: ACTIVE | Noted: 2024-09-25

## 2024-10-17 PROBLEM — K21.9 GASTROESOPHAGEAL REFLUX DISEASE: Status: ACTIVE | Noted: 2024-09-25

## 2024-10-17 PROBLEM — R19.7 DIARRHEA: Status: ACTIVE | Noted: 2024-09-25

## 2024-10-17 PROBLEM — R10.84 GENERALIZED ABDOMINAL PAIN: Status: ACTIVE | Noted: 2024-09-25

## 2024-10-17 PROBLEM — K92.1 HEMATOCHEZIA: Status: ACTIVE | Noted: 2024-09-25

## 2024-10-17 PROBLEM — R13.19 ESOPHAGEAL DYSPHAGIA: Status: ACTIVE | Noted: 2024-09-25

## 2024-10-29 ENCOUNTER — ANESTHESIA EVENT (OUTPATIENT)
Dept: PERIOP | Facility: HOSPITAL | Age: 64
End: 2024-10-29
Payer: COMMERCIAL

## 2024-10-30 ENCOUNTER — HOSPITAL ENCOUNTER (OUTPATIENT)
Facility: HOSPITAL | Age: 64
Setting detail: HOSPITAL OUTPATIENT SURGERY
Discharge: HOME | End: 2024-10-30
Attending: INTERNAL MEDICINE | Admitting: INTERNAL MEDICINE
Payer: COMMERCIAL

## 2024-10-30 ENCOUNTER — ANESTHESIA (OUTPATIENT)
Dept: PERIOP | Facility: HOSPITAL | Age: 64
End: 2024-10-30
Payer: COMMERCIAL

## 2024-10-30 VITALS
DIASTOLIC BLOOD PRESSURE: 69 MMHG | SYSTOLIC BLOOD PRESSURE: 120 MMHG | TEMPERATURE: 98.2 F | WEIGHT: 260 LBS | HEIGHT: 67 IN | RESPIRATION RATE: 20 BRPM | BODY MASS INDEX: 40.81 KG/M2 | OXYGEN SATURATION: 92 % | HEART RATE: 70 BPM

## 2024-10-30 DIAGNOSIS — R13.19 ESOPHAGEAL DYSPHAGIA: ICD-10-CM

## 2024-10-30 DIAGNOSIS — R10.84 GENERALIZED ABDOMINAL PAIN: ICD-10-CM

## 2024-10-30 DIAGNOSIS — K92.1 HEMATOCHEZIA: ICD-10-CM

## 2024-10-30 DIAGNOSIS — Z12.11 ENCOUNTER FOR SCREENING FOR MALIGNANT NEOPLASM OF COLON: ICD-10-CM

## 2024-10-30 DIAGNOSIS — K21.9 GASTROESOPHAGEAL REFLUX DISEASE, UNSPECIFIED WHETHER ESOPHAGITIS PRESENT: ICD-10-CM

## 2024-10-30 DIAGNOSIS — R19.7 DIARRHEA, UNSPECIFIED TYPE: ICD-10-CM

## 2024-10-30 DIAGNOSIS — K59.04 CHRONIC IDIOPATHIC CONSTIPATION: ICD-10-CM

## 2024-10-30 DIAGNOSIS — Z86.0100 PERSONAL HISTORY OF COLONIC POLYPS: ICD-10-CM

## 2024-10-30 DIAGNOSIS — Z86.0100 HISTORY OF COLONIC POLYPS: ICD-10-CM

## 2024-10-30 PROCEDURE — 45378 DIAGNOSTIC COLONOSCOPY: CPT | Performed by: INTERNAL MEDICINE

## 2024-10-30 PROCEDURE — 25010000002 DROPERIDOL PER 5 MG: Performed by: ANESTHESIOLOGY

## 2024-10-30 PROCEDURE — 25010000002 ONDANSETRON PER 1 MG: Performed by: NURSE ANESTHETIST, CERTIFIED REGISTERED

## 2024-10-30 PROCEDURE — 43239 EGD BIOPSY SINGLE/MULTIPLE: CPT | Performed by: INTERNAL MEDICINE

## 2024-10-30 PROCEDURE — 25010000002 FENTANYL CITRATE (PF) 50 MCG/ML SOLUTION: Performed by: NURSE ANESTHETIST, CERTIFIED REGISTERED

## 2024-10-30 PROCEDURE — 25010000002 LIDOCAINE PF 2% 2 % SOLUTION: Performed by: NURSE ANESTHETIST, CERTIFIED REGISTERED

## 2024-10-30 PROCEDURE — 25010000002 PROPOFOL 200 MG/20ML EMULSION: Performed by: NURSE ANESTHETIST, CERTIFIED REGISTERED

## 2024-10-30 PROCEDURE — 43249 ESOPH EGD DILATION <30 MM: CPT | Performed by: INTERNAL MEDICINE

## 2024-10-30 PROCEDURE — C1726 CATH, BAL DIL, NON-VASCULAR: HCPCS | Performed by: INTERNAL MEDICINE

## 2024-10-30 RX ORDER — PROPOFOL 10 MG/ML
INJECTION, EMULSION INTRAVENOUS AS NEEDED
Status: DISCONTINUED | OUTPATIENT
Start: 2024-10-30 | End: 2024-10-30 | Stop reason: SURG

## 2024-10-30 RX ORDER — SODIUM CHLORIDE, SODIUM LACTATE, POTASSIUM CHLORIDE, CALCIUM CHLORIDE 600; 310; 30; 20 MG/100ML; MG/100ML; MG/100ML; MG/100ML
125 INJECTION, SOLUTION INTRAVENOUS ONCE
Status: DISCONTINUED | OUTPATIENT
Start: 2024-10-30 | End: 2024-10-30 | Stop reason: HOSPADM

## 2024-10-30 RX ORDER — SODIUM CHLORIDE 9 MG/ML
40 INJECTION, SOLUTION INTRAVENOUS AS NEEDED
Status: DISCONTINUED | OUTPATIENT
Start: 2024-10-30 | End: 2024-10-30 | Stop reason: HOSPADM

## 2024-10-30 RX ORDER — ONDANSETRON 2 MG/ML
INJECTION INTRAMUSCULAR; INTRAVENOUS AS NEEDED
Status: DISCONTINUED | OUTPATIENT
Start: 2024-10-30 | End: 2024-10-30 | Stop reason: SURG

## 2024-10-30 RX ORDER — ONDANSETRON 2 MG/ML
4 INJECTION INTRAMUSCULAR; INTRAVENOUS AS NEEDED
Status: DISCONTINUED | OUTPATIENT
Start: 2024-10-30 | End: 2024-10-30 | Stop reason: HOSPADM

## 2024-10-30 RX ORDER — LIDOCAINE HYDROCHLORIDE 20 MG/ML
INJECTION, SOLUTION EPIDURAL; INFILTRATION; INTRACAUDAL; PERINEURAL AS NEEDED
Status: DISCONTINUED | OUTPATIENT
Start: 2024-10-30 | End: 2024-10-30 | Stop reason: SURG

## 2024-10-30 RX ORDER — IPRATROPIUM BROMIDE AND ALBUTEROL SULFATE 2.5; .5 MG/3ML; MG/3ML
3 SOLUTION RESPIRATORY (INHALATION) ONCE AS NEEDED
Status: DISCONTINUED | OUTPATIENT
Start: 2024-10-30 | End: 2024-10-30 | Stop reason: HOSPADM

## 2024-10-30 RX ORDER — KETOROLAC TROMETHAMINE 30 MG/ML
30 INJECTION, SOLUTION INTRAMUSCULAR; INTRAVENOUS EVERY 6 HOURS PRN
Status: DISCONTINUED | OUTPATIENT
Start: 2024-10-30 | End: 2024-10-30 | Stop reason: HOSPADM

## 2024-10-30 RX ORDER — DROPERIDOL 2.5 MG/ML
0.62 INJECTION, SOLUTION INTRAMUSCULAR; INTRAVENOUS ONCE
Status: COMPLETED | OUTPATIENT
Start: 2024-10-30 | End: 2024-10-30

## 2024-10-30 RX ORDER — SODIUM CHLORIDE 0.9 % (FLUSH) 0.9 %
10 SYRINGE (ML) INJECTION EVERY 12 HOURS SCHEDULED
Status: DISCONTINUED | OUTPATIENT
Start: 2024-10-30 | End: 2024-10-30 | Stop reason: HOSPADM

## 2024-10-30 RX ORDER — FENTANYL CITRATE 50 UG/ML
50 INJECTION, SOLUTION INTRAMUSCULAR; INTRAVENOUS
Status: DISCONTINUED | OUTPATIENT
Start: 2024-10-30 | End: 2024-10-30 | Stop reason: HOSPADM

## 2024-10-30 RX ORDER — MIDAZOLAM HYDROCHLORIDE 1 MG/ML
1 INJECTION, SOLUTION INTRAMUSCULAR; INTRAVENOUS
Status: DISCONTINUED | OUTPATIENT
Start: 2024-10-30 | End: 2024-10-30 | Stop reason: HOSPADM

## 2024-10-30 RX ORDER — FENTANYL CITRATE 50 UG/ML
INJECTION, SOLUTION INTRAMUSCULAR; INTRAVENOUS AS NEEDED
Status: DISCONTINUED | OUTPATIENT
Start: 2024-10-30 | End: 2024-10-30 | Stop reason: SURG

## 2024-10-30 RX ORDER — SODIUM CHLORIDE 0.9 % (FLUSH) 0.9 %
10 SYRINGE (ML) INJECTION AS NEEDED
Status: DISCONTINUED | OUTPATIENT
Start: 2024-10-30 | End: 2024-10-30 | Stop reason: HOSPADM

## 2024-10-30 RX ORDER — MEPERIDINE HYDROCHLORIDE 25 MG/ML
12.5 INJECTION INTRAMUSCULAR; INTRAVENOUS; SUBCUTANEOUS
Status: DISCONTINUED | OUTPATIENT
Start: 2024-10-30 | End: 2024-10-30 | Stop reason: HOSPADM

## 2024-10-30 RX ORDER — OXYCODONE AND ACETAMINOPHEN 5; 325 MG/1; MG/1
1 TABLET ORAL ONCE AS NEEDED
Status: DISCONTINUED | OUTPATIENT
Start: 2024-10-30 | End: 2024-10-30 | Stop reason: HOSPADM

## 2024-10-30 RX ADMIN — PROPOFOL 50 MG: 10 INJECTION, EMULSION INTRAVENOUS at 10:02

## 2024-10-30 RX ADMIN — PROPOFOL 100 MG: 10 INJECTION, EMULSION INTRAVENOUS at 09:52

## 2024-10-30 RX ADMIN — DROPERIDOL 0.62 MG: 2.5 INJECTION, SOLUTION INTRAMUSCULAR; INTRAVENOUS at 11:03

## 2024-10-30 RX ADMIN — PROPOFOL 50 MG: 10 INJECTION, EMULSION INTRAVENOUS at 09:57

## 2024-10-30 RX ADMIN — FENTANYL CITRATE 100 MCG: 50 INJECTION INTRAMUSCULAR; INTRAVENOUS at 09:52

## 2024-10-30 RX ADMIN — PROPOFOL 50 MG: 10 INJECTION, EMULSION INTRAVENOUS at 10:17

## 2024-10-30 RX ADMIN — ONDANSETRON 4 MG: 2 INJECTION INTRAMUSCULAR; INTRAVENOUS at 10:42

## 2024-10-30 RX ADMIN — PROPOFOL 100 MG: 10 INJECTION, EMULSION INTRAVENOUS at 10:22

## 2024-10-30 RX ADMIN — PROPOFOL 100 MG: 10 INJECTION, EMULSION INTRAVENOUS at 10:07

## 2024-10-30 RX ADMIN — PROPOFOL 50 MG: 10 INJECTION, EMULSION INTRAVENOUS at 10:31

## 2024-10-30 RX ADMIN — PROPOFOL 50 MG: 10 INJECTION, EMULSION INTRAVENOUS at 10:12

## 2024-10-30 RX ADMIN — PROPOFOL 50 MG: 10 INJECTION, EMULSION INTRAVENOUS at 10:27

## 2024-10-30 RX ADMIN — LIDOCAINE HYDROCHLORIDE 100 MG: 20 INJECTION, SOLUTION EPIDURAL; INFILTRATION; INTRACAUDAL; PERINEURAL at 09:52

## 2024-10-30 RX ADMIN — ONDANSETRON 4 MG: 2 INJECTION INTRAMUSCULAR; INTRAVENOUS at 09:56

## 2024-10-30 NOTE — ANESTHESIA POSTPROCEDURE EVALUATION
Patient: Lauren Klein    Procedure Summary       Date: 10/30/24 Room / Location:  [facility] [address] [place] [address] [facility]    Anesthesia Start: 0950 Anesthesia Stop: 1032    Procedures:       ESOPHAGOGASTRODUODENOSCOPY WITH BIOPSY (Esophagus)      COLONOSCOPY FOR SCREENING Diagnosis:       Personal history of colonic polyps      Encounter for screening for malignant neoplasm of colon      Chronic idiopathic constipation      Diarrhea, unspecified type      Generalized abdominal pain      Esophageal dysphagia      Gastroesophageal reflux disease, unspecified whether esophagitis present      Hematochezia      (Personal history of colonic polyps [Z86.010])      (Encounter for screening for malignant neoplasm of colon [Z12.11])      (Chronic idiopathic constipation [K59.04])      (Diarrhea, unspecified type [R19.7])      (Generalized abdominal pain [R10.84])      (Esophageal dysphagia [R13.19])      (Gastroesophageal reflux disease, unspecified whether esophagitis present [K21.9])      (Hematochezia [K92.1])    Surgeons: Heaven Aguila MD Provider: Drew Srinivasan MD    Anesthesia Type: general ASA Status: 3            Anesthesia Type: general    Vitals  Vitals Value Taken Time   /69 10/30/24 1120   Temp 98.2 °F (36.8 °C) 10/30/24 1034   Pulse 72 10/30/24 1124   Resp 20 10/30/24 1115   SpO2 92 % 10/30/24 1124   Vitals shown include unfiled device data.        Post Anesthesia Care and Evaluation    Patient location during evaluation: PHASE II  Patient participation: complete - patient participated  Level of consciousness: awake and alert  Pain score: 1  Pain management: adequate    Airway patency: patent  Anesthetic complications: No anesthetic complications  PONV Status: controlled  Cardiovascular status: acceptable  Respiratory status: acceptable  Hydration status: acceptable

## 2024-10-30 NOTE — H&P
"    [facility]    Patient Identification:  Name:  Lauren Klein  Age:  64 y.o.  Sex:  female  :  1960  MRN:  [id number]   Visit Number:  [id number]  Primary Care Physician:  Mare Cool APRN       Chief complaint: hematochezia, hx of colon polyps, dysphagia, hematochezia.    History of presenting illness:  64 y.o. female who presents today for EGD/colonoscopy for the above complaints.  Patient previously followed with Dr. Merino for several years and had two prior colonoscopies.  Patient reports having her first colonoscopy ~15 years ago and was reportedly found to have several ulcerations throughout her colon and 6 colon polyps removed. She reports a remote history of UC. She was on an unknown oral medication for 2 years. Repeat colonoscopy was not notable for ulcerative colitis and patient's medication was discontinued. Patient's most recent colonoscopy was approximately 5 years ago. Recently, patient reports that GERD has worsened.  Patient had been off PPI therapy.  She was initiated on Protonix 40 mg daily in office on last visit.  Today, patient reports that she is still having 1-2 flares of acid reflux per week.  She does note drinking 2 diet Mountain Dew's per day.  She notes epigastric pain that is \"cutting.\"  She states that this pain occurs very infrequently.  No alleviating or exacerbating factors identified.  States that she has dysphagia to solid food.  Patiently, she will have to regurgitate this food in order to alleviate this sensation.  She reports daily episodes of diarrhea.  States that she will have approximately 3 bowel movements per day.  She will occasionally have formed stools.  She notes having blood in her stool approximately 2 times per month.  She denies unintentional weight loss, nausea, vomiting, and hematemesis.     Review of Systems   Constitutional: Negative.    HENT:  Positive for trouble swallowing.    Cardiovascular: " Negative.    Gastrointestinal:  Positive for abdominal pain, blood in stool, constipation and diarrhea. Negative for anal bleeding, nausea, rectal pain and vomiting.   All other systems reviewed and are negative.       Past Medical History:   Diagnosis Date    Arthritis     Colitis     GERD (gastroesophageal reflux disease)     Hypertension     PONV (postoperative nausea and vomiting)      Past Surgical History:   Procedure Laterality Date    CHOLECYSTECTOMY      COLONOSCOPY      ENDOSCOPY      HYSTERECTOMY      age 50    TONSILLECTOMY       Family History   Problem Relation Age of Onset    Hypertension Mother     Asthma Mother     Hypertension Father     Hypertension Brother     Cancer Maternal Grandmother     Heart disease Paternal Grandfather     Breast cancer Neg Hx      Social History     Socioeconomic History    Marital status:    Tobacco Use    Smoking status: Former     Current packs/day: 0.00     Types: Cigarettes     Quit date:      Years since quittin.8     Passive exposure: Past    Smokeless tobacco: Never    Tobacco comments:     Smoked on and off very little short period of time years ago at least 35 years ago   Vaping Use    Vaping status: Never Used   Substance and Sexual Activity    Alcohol use: No    Drug use: No    Sexual activity: Yes     Partners: Male     Birth control/protection: Vasectomy, Hysterectomy       Allergies:  Dilaudid [hydromorphone] and Sulfa antibiotics    Prior to Admission Medications       Prescriptions Last Dose Informant Patient Reported? Taking?    losartan (COZAAR) 25 MG tablet   Yes No    Take 1 tablet by mouth Daily.    calcium polycarbophil (FiberCon) 625 MG tablet   No No    Take 2 tablets by mouth daily    pantoprazole (PROTONIX) 40 MG EC tablet   No No    Take 1 tablet by mouth Daily.    polyethylene glycol (MIRALAX) 17 GM/SCOOP powder   No No    Take 510 g by mouth Take As Directed. Place 15 cap fulls of powder in 32 oz of liquid of choice at  "4:00 and 10:00 PM          Hospital Scheduled Meds:  lactated ringers, 125 mL/hr, Intravenous, Once  sodium chloride, 10 mL, Intravenous, Q12H             Vital Signs:  Temp:  [98.3 °F (36.8 °C)] 98.3 °F (36.8 °C)  Heart Rate:  [84] 84  Resp:  [20] 20  BP: (140)/(82) 140/82      10/30/24  0906   Weight: 118 kg (260 lb)     Body mass index is 40.72 kg/m².    Physical Exam:  Constitutional:  Alert and oriented. Well developed and well nourished, in no acute distress.  HENT:  Head: Normocephalic and atraumatic.  Mouth:  Moist mucous membranes.  OP clear, mmm  Eyes:  Conjunctivae and EOM are normal.  Pupils are equal, round, and reactive to light.  No scleral icterus.  Neck:  Neck supple.  No JVD present.    Cardiovascular:  RRR, no MRG.  Pulmonary/Chest:  CTAB, unlabored.   Abdominal:  Soft.  Bowel sounds are normal.  No distension and no tenderness.   Musculoskeletal:  No edema, no tenderness, and no deformity.   Psychiatric:  Normal mood and affect.  Behavior is normal.  Judgment and thought content normal.                     Invalid input(s): \"PROT\"CrCl cannot be calculated (Patient's most recent lab result is older than the maximum 30 days allowed.).  No results found for: \"AMMONIA\"          No results found for: \"HGBA1C\"  No results found for: \"TSH\", \"FREET4\"  No results found for: \"PREGTESTUR\", \"PREGSERUM\", \"HCG\", \"HCGQUANT\"  Pain Management Panel           No data to display              No results found for: \"BLOODCX\"  No results found for: \"URINECX\"  No results found for: \"WOUNDCX\"  No results found for: \"STOOLCX\"        Imaging Results (Last 7 Days)       ** No results found for the last 168 hours. **              Assessment and Plan:    Proceed with EGD for further evaluation of GERD and dysphagia.   Proceed with colonoscopy d/t personal hx of colon polyps, hematochezia, and diarrhea.     Liz Caputo PA-C  10/30/24  09:41 EDT    "

## 2024-10-30 NOTE — ANESTHESIA PREPROCEDURE EVALUATION
Anesthesia Evaluation     history of anesthetic complications:  PONV  NPO Solid Status: > 8 hours  NPO Liquid Status: > 8 hours           Airway   Mallampati: II  TM distance: >3 FB  Neck ROM: full  No difficulty expected  Dental    (+) implants    Pulmonary - normal exam   Cardiovascular - normal exam    (+) hypertension      Neuro/Psych  GI/Hepatic/Renal/Endo    (+) obesity, GERD    Musculoskeletal     Abdominal  - normal exam    Bowel sounds: normal.   Substance History      OB/GYN          Other   arthritis,                 Anesthesia Plan    ASA 3     general     intravenous induction     Anesthetic plan, risks, benefits, and alternatives have been provided, discussed and informed consent has been obtained with: patient.    CODE STATUS:

## 2024-11-02 LAB — REF LAB TEST METHOD: NORMAL

## 2024-11-03 ENCOUNTER — HOSPITAL ENCOUNTER (EMERGENCY)
Facility: HOSPITAL | Age: 64
Discharge: HOME | End: 2024-11-03
Attending: STUDENT IN AN ORGANIZED HEALTH CARE EDUCATION/TRAINING PROGRAM | Admitting: STUDENT IN AN ORGANIZED HEALTH CARE EDUCATION/TRAINING PROGRAM
Payer: COMMERCIAL

## 2024-11-03 ENCOUNTER — APPOINTMENT (OUTPATIENT)
Dept: CT IMAGING | Facility: HOSPITAL | Age: 64
End: 2024-11-03
Payer: COMMERCIAL

## 2024-11-03 VITALS
DIASTOLIC BLOOD PRESSURE: 73 MMHG | OXYGEN SATURATION: 96 % | SYSTOLIC BLOOD PRESSURE: 120 MMHG | HEIGHT: 67 IN | HEART RATE: 67 BPM | WEIGHT: 260.14 LBS | TEMPERATURE: 97.7 F | BODY MASS INDEX: 40.83 KG/M2 | RESPIRATION RATE: 18 BRPM

## 2024-11-03 DIAGNOSIS — K59.00 CONSTIPATION, UNSPECIFIED CONSTIPATION TYPE: Primary | ICD-10-CM

## 2024-11-03 LAB
ALBUMIN SERPL-MCNC: 3.9 G/DL (ref 3.5–5.2)
ALBUMIN/GLOB SERPL: 1.3 G/DL
ALP SERPL-CCNC: 64 U/L (ref 39–117)
ALT SERPL W P-5'-P-CCNC: 21 U/L (ref 1–33)
ANION GAP SERPL CALCULATED.3IONS-SCNC: 11.8 MMOL/L (ref 5–15)
AST SERPL-CCNC: 22 U/L (ref 1–32)
BASOPHILS # BLD AUTO: 0.04 10*3/MM3 (ref 0–0.2)
BASOPHILS NFR BLD AUTO: 0.4 % (ref 0–1.5)
BILIRUB SERPL-MCNC: 0.4 MG/DL (ref 0–1.2)
BILIRUB UR QL STRIP: NEGATIVE
BUN SERPL-MCNC: 13 MG/DL (ref 8–23)
BUN/CREAT SERPL: 24.5 (ref 7–25)
CALCIUM SPEC-SCNC: 9.4 MG/DL (ref 8.6–10.5)
CHLORIDE SERPL-SCNC: 103 MMOL/L (ref 98–107)
CLARITY UR: CLEAR
CO2 SERPL-SCNC: 22.2 MMOL/L (ref 22–29)
COLOR UR: YELLOW
CREAT SERPL-MCNC: 0.53 MG/DL (ref 0.57–1)
DEPRECATED RDW RBC AUTO: 44.2 FL (ref 37–54)
EGFRCR SERPLBLD CKD-EPI 2021: 104.7 ML/MIN/1.73
EOSINOPHIL # BLD AUTO: 0.06 10*3/MM3 (ref 0–0.4)
EOSINOPHIL NFR BLD AUTO: 0.5 % (ref 0.3–6.2)
ERYTHROCYTE [DISTWIDTH] IN BLOOD BY AUTOMATED COUNT: 13 % (ref 12.3–15.4)
GLOBULIN UR ELPH-MCNC: 3.1 GM/DL
GLUCOSE SERPL-MCNC: 123 MG/DL (ref 65–99)
GLUCOSE UR STRIP-MCNC: NEGATIVE MG/DL
HCT VFR BLD AUTO: 44.9 % (ref 34–46.6)
HGB BLD-MCNC: 14.5 G/DL (ref 12–15.9)
HGB UR QL STRIP.AUTO: NEGATIVE
HOLD SPECIMEN: NORMAL
IMM GRANULOCYTES # BLD AUTO: 0.03 10*3/MM3 (ref 0–0.05)
IMM GRANULOCYTES NFR BLD AUTO: 0.3 % (ref 0–0.5)
KETONES UR QL STRIP: NEGATIVE
LEUKOCYTE ESTERASE UR QL STRIP.AUTO: NEGATIVE
LIPASE SERPL-CCNC: 23 U/L (ref 13–60)
LYMPHOCYTES # BLD AUTO: 1.58 10*3/MM3 (ref 0.7–3.1)
LYMPHOCYTES NFR BLD AUTO: 14 % (ref 19.6–45.3)
MCH RBC QN AUTO: 29.4 PG (ref 26.6–33)
MCHC RBC AUTO-ENTMCNC: 32.3 G/DL (ref 31.5–35.7)
MCV RBC AUTO: 91.1 FL (ref 79–97)
MONOCYTES # BLD AUTO: 0.86 10*3/MM3 (ref 0.1–0.9)
MONOCYTES NFR BLD AUTO: 7.6 % (ref 5–12)
NEUTROPHILS NFR BLD AUTO: 77.2 % (ref 42.7–76)
NEUTROPHILS NFR BLD AUTO: 8.7 10*3/MM3 (ref 1.7–7)
NITRITE UR QL STRIP: NEGATIVE
NRBC BLD AUTO-RTO: 0 /100 WBC (ref 0–0.2)
PH UR STRIP.AUTO: 6 [PH] (ref 5–8)
PLATELET # BLD AUTO: 240 10*3/MM3 (ref 140–450)
PMV BLD AUTO: 9.6 FL (ref 6–12)
POTASSIUM SERPL-SCNC: 3.8 MMOL/L (ref 3.5–5.2)
PROT SERPL-MCNC: 7 G/DL (ref 6–8.5)
PROT UR QL STRIP: NEGATIVE
QT INTERVAL: 376 MS
QTC INTERVAL: 447 MS
RBC # BLD AUTO: 4.93 10*6/MM3 (ref 3.77–5.28)
SODIUM SERPL-SCNC: 137 MMOL/L (ref 136–145)
SP GR UR STRIP: 1.02 (ref 1–1.03)
UROBILINOGEN UR QL STRIP: NORMAL
WBC NRBC COR # BLD AUTO: 11.27 10*3/MM3 (ref 3.4–10.8)
WHOLE BLOOD HOLD COAG: NORMAL
WHOLE BLOOD HOLD SPECIMEN: NORMAL

## 2024-11-03 PROCEDURE — 74177 CT ABD & PELVIS W/CONTRAST: CPT

## 2024-11-03 PROCEDURE — 25510000001 IOPAMIDOL 61 % SOLUTION: Performed by: STUDENT IN AN ORGANIZED HEALTH CARE EDUCATION/TRAINING PROGRAM

## 2024-11-03 PROCEDURE — 93005 ELECTROCARDIOGRAM TRACING: CPT | Performed by: STUDENT IN AN ORGANIZED HEALTH CARE EDUCATION/TRAINING PROGRAM

## 2024-11-03 PROCEDURE — 74177 CT ABD & PELVIS W/CONTRAST: CPT | Performed by: RADIOLOGY

## 2024-11-03 PROCEDURE — 80053 COMPREHEN METABOLIC PANEL: CPT | Performed by: STUDENT IN AN ORGANIZED HEALTH CARE EDUCATION/TRAINING PROGRAM

## 2024-11-03 PROCEDURE — 36415 COLL VENOUS BLD VENIPUNCTURE: CPT

## 2024-11-03 PROCEDURE — 96374 THER/PROPH/DIAG INJ IV PUSH: CPT

## 2024-11-03 PROCEDURE — 81003 URINALYSIS AUTO W/O SCOPE: CPT | Performed by: STUDENT IN AN ORGANIZED HEALTH CARE EDUCATION/TRAINING PROGRAM

## 2024-11-03 PROCEDURE — 83690 ASSAY OF LIPASE: CPT | Performed by: STUDENT IN AN ORGANIZED HEALTH CARE EDUCATION/TRAINING PROGRAM

## 2024-11-03 PROCEDURE — 99285 EMERGENCY DEPT VISIT HI MDM: CPT

## 2024-11-03 PROCEDURE — 96375 TX/PRO/DX INJ NEW DRUG ADDON: CPT

## 2024-11-03 PROCEDURE — 85025 COMPLETE CBC W/AUTO DIFF WBC: CPT | Performed by: STUDENT IN AN ORGANIZED HEALTH CARE EDUCATION/TRAINING PROGRAM

## 2024-11-03 PROCEDURE — 25010000002 ONDANSETRON PER 1 MG

## 2024-11-03 RX ORDER — PANTOPRAZOLE SODIUM 40 MG/10ML
80 INJECTION, POWDER, LYOPHILIZED, FOR SOLUTION INTRAVENOUS ONCE
Status: COMPLETED | OUTPATIENT
Start: 2024-11-03 | End: 2024-11-03

## 2024-11-03 RX ORDER — ONDANSETRON 4 MG/1
4 TABLET, FILM COATED ORAL EVERY 8 HOURS PRN
Qty: 12 TABLET | Refills: 0 | Status: SHIPPED | OUTPATIENT
Start: 2024-11-03

## 2024-11-03 RX ORDER — ONDANSETRON 2 MG/ML
INJECTION INTRAMUSCULAR; INTRAVENOUS
Status: COMPLETED
Start: 2024-11-03 | End: 2024-11-03

## 2024-11-03 RX ORDER — IOPAMIDOL 612 MG/ML
100 INJECTION, SOLUTION INTRAVASCULAR
Status: COMPLETED | OUTPATIENT
Start: 2024-11-03 | End: 2024-11-03

## 2024-11-03 RX ORDER — ONDANSETRON 2 MG/ML
4 INJECTION INTRAMUSCULAR; INTRAVENOUS ONCE
Status: COMPLETED | OUTPATIENT
Start: 2024-11-03 | End: 2024-11-03

## 2024-11-03 RX ORDER — PANTOPRAZOLE SODIUM 40 MG/10ML
INJECTION, POWDER, LYOPHILIZED, FOR SOLUTION INTRAVENOUS
Status: COMPLETED
Start: 2024-11-03 | End: 2024-11-03

## 2024-11-03 RX ORDER — AMOXICILLIN 250 MG
1 CAPSULE ORAL DAILY
Qty: 30 TABLET | Refills: 0 | Status: SHIPPED | OUTPATIENT
Start: 2024-11-03

## 2024-11-03 RX ADMIN — ONDANSETRON 4 MG: 2 INJECTION INTRAMUSCULAR; INTRAVENOUS at 04:11

## 2024-11-03 RX ADMIN — PANTOPRAZOLE SODIUM 80 MG: 40 INJECTION, POWDER, LYOPHILIZED, FOR SOLUTION INTRAVENOUS at 04:13

## 2024-11-03 RX ADMIN — IOPAMIDOL 80 ML: 612 INJECTION, SOLUTION INTRAVENOUS at 03:32

## 2024-11-03 RX ADMIN — PANTOPRAZOLE SODIUM 80 MG: 40 INJECTION, POWDER, FOR SOLUTION INTRAVENOUS at 04:13

## 2024-11-03 NOTE — ED PROVIDER NOTES
"Subjective   History of Present Illness  64-year-old female patient with known past medical history significant for GERD, colitis presents to [facility] emergency department for further evaluation of abdominal discomfort as well as \"small blood clots in stool\" after self administering an enema a few days after having a colonoscopy and EGD with esophageal stretching.  Patient states that she has had multiple colonoscopies and EGDs in the past, however she has had abdominal discomfort every day since procedure was completed this time.  She denies vomiting.  States that she is passing gas.  Was constipated, however after enema she was able to have a large bowel movement.  She states that there were some small blood clots in the stool, however no ongoing rectal bleeding or significant amount of blood observed.  She denies dizziness, palpitations, syncope, or other acute complaints.  No fever or chills.  Patient states that she does take 40 mg Protonix daily for GERD.  She does have a history of bleeding gastric ulcers as well.    History provided by:  Patient   used: No      Review of Systems   Constitutional:  Negative for chills and fever.   HENT: Negative.     Respiratory: Negative.     Cardiovascular: Negative.    Gastrointestinal:  Positive for abdominal pain (Discomfort), anal bleeding, blood in stool, constipation and nausea. Negative for vomiting.   Genitourinary: Negative.    Musculoskeletal:  Negative for gait problem.   Skin:  Negative for rash.   Neurological:  Negative for dizziness, syncope, weakness and light-headedness.       Past Medical History:   Diagnosis Date    Arthritis     Colitis     GERD (gastroesophageal reflux disease)     Hypertension 2022    PONV (postoperative nausea and vomiting)        Allergies   Allergen Reactions    Dilaudid [Hydromorphone] Swelling     SWELLING AND NAUSEA    Sulfa Antibiotics Rash       Past Surgical History:   Procedure Laterality Date "    CHOLECYSTECTOMY      COLONOSCOPY      COLONOSCOPY N/A 10/30/2024    Procedure: COLONOSCOPY FOR SCREENING;  Surgeon: Heaven Aguila MD;  Location: [facility] OR;  Service: Gastroenterology;  Laterality: N/A;    ENDOSCOPY      ENDOSCOPY N/A 10/30/2024    Procedure: ESOPHAGOGASTRODUODENOSCOPY WITH BIOPSY;  Surgeon: Heaven Aguila MD;  Location: [facility] OR;  Service: Gastroenterology;  Laterality: N/A;  Patient dilated to 20 mm per MD Guzmán    HYSTERECTOMY      age 50    TONSILLECTOMY         Family History   Problem Relation Age of Onset    Hypertension Mother     Asthma Mother     Hypertension Father     Hypertension Brother     Cancer Maternal Grandmother     Heart disease Paternal Grandfather     Breast cancer Neg Hx        Social History     Socioeconomic History    Marital status:    Tobacco Use    Smoking status: Former     Current packs/day: 0.00     Types: Cigarettes     Quit date:      Years since quittin.8     Passive exposure: Past    Smokeless tobacco: Never    Tobacco comments:     Smoked on and off very little short period of time years ago at least 35 years ago   Vaping Use    Vaping status: Never Used   Substance and Sexual Activity    Alcohol use: No    Drug use: No    Sexual activity: Yes     Partners: Male     Birth control/protection: Vasectomy, Hysterectomy           Objective   Physical Exam  Vitals reviewed.   Constitutional:       General: She is awake. She is not in acute distress.     Appearance: She is obese. She is not ill-appearing or diaphoretic.   HENT:      Head: Normocephalic and atraumatic.      Mouth/Throat:      Mouth: Mucous membranes are moist.      Pharynx: Oropharynx is clear.   Eyes:      Extraocular Movements: Extraocular movements intact.      Pupils: Pupils are equal, round, and reactive to light.   Cardiovascular:      Rate and Rhythm: Normal rate and regular rhythm.      Pulses: Normal pulses.           Dorsalis pedis pulses are 2+ on  the right side and 2+ on the left side.      Heart sounds: Normal heart sounds. No murmur heard.     No friction rub.   Pulmonary:      Effort: Pulmonary effort is normal. No accessory muscle usage, respiratory distress or retractions.      Breath sounds: No wheezing, rhonchi or rales.   Abdominal:      General: Bowel sounds are normal. There is no distension.      Palpations: Abdomen is soft.      Tenderness: There is no abdominal tenderness. There is no guarding.   Musculoskeletal:      Cervical back: Neck supple. No rigidity.      Right lower leg: No edema.      Left lower leg: No edema.   Skin:     General: Skin is warm and dry.      Capillary Refill: Capillary refill takes 2 to 3 seconds.   Neurological:      Mental Status: She is alert and oriented to person, place, and time. Mental status is at baseline.      Cranial Nerves: No dysarthria or facial asymmetry.      Sensory: Sensation is intact.      Motor: No weakness or tremor.   Psychiatric:         Attention and Perception: Attention normal.         Mood and Affect: Mood normal.         Speech: Speech normal.         Behavior: Behavior normal. Behavior is cooperative.         Thought Content: Thought content normal.         Cognition and Memory: Cognition normal.         Judgment: Judgment normal.       Procedures           ED Course  ED Course as of 11/03/24 0552   Sun Nov 03, 2024   0100 EKG independently interpreted by me  Rate 85  TN interval 138  QRS duration 90  Normal sinus rhythm  Left axis deviation  No STEMI  Electronically signed by Jack Toribio DO, 11/03/24, 1:00 AM EDT.   [OI]   0516 CT abdomen and pelvis with contrast--  IMPRESSION:     1.  Fatty filtration of the liver.  2.  Cholecystectomy.  3.  Nonobstructive left nephrolithiasis.  4.  No features of acute appendicitis.  5.  Bilateral lower pelvic phleboliths  6.  No free fluid or free air. No abscess or hematoma.  7.  No features of enteritis or colitis.  8.  No free air or hematoma.    [MC]      ED Course User Index  [MC] Chanelle Flower APRN  [OI] Jack Toribio DO      Results for orders placed or performed during the hospital encounter of 11/03/24   ECG 12 Lead QT Measurement    Collection Time: 11/03/24 12:56 AM   Result Value Ref Range    QT Interval 376 ms    QTC Interval 447 ms   Comprehensive Metabolic Panel    Collection Time: 11/03/24  1:40 AM    Specimen: Arm, Right; Blood   Result Value Ref Range    Glucose 123 (H) 65 - 99 mg/dL    BUN 13 8 - 23 mg/dL    Creatinine 0.53 (L) 0.57 - 1.00 mg/dL    Sodium 137 136 - 145 mmol/L    Potassium 3.8 3.5 - 5.2 mmol/L    Chloride 103 98 - 107 mmol/L    CO2 22.2 22.0 - 29.0 mmol/L    Calcium 9.4 8.6 - 10.5 mg/dL    Total Protein 7.0 6.0 - 8.5 g/dL    Albumin 3.9 3.5 - 5.2 g/dL    ALT (SGPT) 21 1 - 33 U/L    AST (SGOT) 22 1 - 32 U/L    Alkaline Phosphatase 64 39 - 117 U/L    Total Bilirubin 0.4 0.0 - 1.2 mg/dL    Globulin 3.1 gm/dL    A/G Ratio 1.3 g/dL    BUN/Creatinine Ratio 24.5 7.0 - 25.0    Anion Gap 11.8 5.0 - 15.0 mmol/L    eGFR 104.7 >60.0 mL/min/1.73   Lipase    Collection Time: 11/03/24  1:40 AM    Specimen: Arm, Right; Blood   Result Value Ref Range    Lipase 23 13 - 60 U/L   CBC Auto Differential    Collection Time: 11/03/24  1:40 AM    Specimen: Arm, Right; Blood   Result Value Ref Range    WBC 11.27 (H) 3.40 - 10.80 10*3/mm3    RBC 4.93 3.77 - 5.28 10*6/mm3    Hemoglobin 14.5 12.0 - 15.9 g/dL    Hematocrit 44.9 34.0 - 46.6 %    MCV 91.1 79.0 - 97.0 fL    MCH 29.4 26.6 - 33.0 pg    MCHC 32.3 31.5 - 35.7 g/dL    RDW 13.0 12.3 - 15.4 %    RDW-SD 44.2 37.0 - 54.0 fl    MPV 9.6 6.0 - 12.0 fL    Platelets 240 140 - 450 10*3/mm3    Neutrophil % 77.2 (H) 42.7 - 76.0 %    Lymphocyte % 14.0 (L) 19.6 - 45.3 %    Monocyte % 7.6 5.0 - 12.0 %    Eosinophil % 0.5 0.3 - 6.2 %    Basophil % 0.4 0.0 - 1.5 %    Immature Grans % 0.3 0.0 - 0.5 %    Neutrophils, Absolute 8.70 (H) 1.70 - 7.00 10*3/mm3    Lymphocytes, Absolute 1.58 0.70 - 3.10 10*3/mm3     Monocytes, Absolute 0.86 0.10 - 0.90 10*3/mm3    Eosinophils, Absolute 0.06 0.00 - 0.40 10*3/mm3    Basophils, Absolute 0.04 0.00 - 0.20 10*3/mm3    Immature Grans, Absolute 0.03 0.00 - 0.05 10*3/mm3    nRBC 0.0 0.0 - 0.2 /100 WBC   Green Top (Gel)    Collection Time: 11/03/24  1:40 AM   Result Value Ref Range    Extra Tube Hold for add-ons.    Lavender Top    Collection Time: 11/03/24  1:40 AM   Result Value Ref Range    Extra Tube hold for add-on    Gold Top - SST    Collection Time: 11/03/24  1:40 AM   Result Value Ref Range    Extra Tube Hold for add-ons.    Gray Top    Collection Time: 11/03/24  1:40 AM   Result Value Ref Range    Extra Tube Hold for add-ons.    Light Blue Top    Collection Time: 11/03/24  1:40 AM   Result Value Ref Range    Extra Tube Hold for add-ons.    Urinalysis With Microscopic If Indicated (No Culture) - Urine, Clean Catch    Collection Time: 11/03/24  3:47 AM    Specimen: Urine, Clean Catch   Result Value Ref Range    Color, UA Yellow Yellow, Straw    Appearance, UA Clear Clear    pH, UA 6.0 5.0 - 8.0    Specific Gravity, UA 1.023 1.005 - 1.030    Glucose, UA Negative Negative    Ketones, UA Negative Negative    Bilirubin, UA Negative Negative    Blood, UA Negative Negative    Protein, UA Negative Negative    Leuk Esterase, UA Negative Negative    Nitrite, UA Negative Negative    Urobilinogen, UA 0.2 E.U./dL 0.2 - 1.0 E.U./dL    CT Abdomen Pelvis With Contrast    Result Date: 11/3/2024   1.  Fatty filtration of the liver. 2.  Cholecystectomy. 3.  Nonobstructive left nephrolithiasis. 4.  No features of acute appendicitis. 5.  Bilateral lower pelvic phleboliths 6.  No free fluid or free air. No abscess or hematoma. 7.  No features of enteritis or colitis. 8.  No free air or hematoma.   This report was finalized on 11/3/2024 5:05 AM by Jimmy Sandhu MD.                                             Medical Decision Making  CT of the abdomen and pelvis without contrast noted no acute  findings.  There were no features of acute appendicitis.  No free fluid or air.  No abscess or hematoma.  No features of enteritis or colitis.  No hematoma.  Labs largely unremarkable.  No evidence of infectious process.  Patient received IV Protonix while in the ED.  She also received Zofran.  States that symptoms improved.  Recommended that patient continue to take 40 mg oral Protonix daily as previously prescribed.  Recommend that patient take stool softeners/gentle laxatives daily to prevent straining and/or irritation of the colon/rectum.  Suspect that patient had some irritation from the enema that she self administered.  There has been no more rectal bleeding.  Hemoglobin stable.  Patient okay for discharge from the ED at this time.  She should follow-up with PCP ASAP for post ED visit evaluation.  Discussed findings and plans with patient.  She voiced agreement and understanding with no further questions or concerns at this time.    Problems Addressed:  Constipation, unspecified constipation type: complicated acute illness or injury    Amount and/or Complexity of Data Reviewed  Labs: ordered.  Radiology: ordered.  ECG/medicine tests: ordered.    Risk  OTC drugs.  Prescription drug management.        Final diagnoses:   Constipation, unspecified constipation type       ED Disposition  ED Disposition       ED Disposition   Discharge    Condition   Stable    Comment   --               Mare Cool, APRN  [address] [id number] [phone]    Schedule an appointment as soon as possible for a visit in 1 day  Post ER visit evaluation         Medication List        New Prescriptions      ondansetron 4 MG tablet  Commonly known as: Zofran  Take 1 tablet by mouth Every 8 (Eight) Hours As Needed for Nausea or Vomiting.     sennosides-docusate 8.6-50 MG per tablet  Commonly known as: PERICOLACE  Take 1 tablet by mouth Daily.               Where to Get Your Medications        These  medications were sent to [facility], [place] - [address] [place] [address] - [phone]  - [phone]   [address] [facility] [address]      Phone: [phone]   ondansetron 4 MG tablet  sennosides-docusate 8.6-50 MG per tablet            Chanelle Flower, YNES  11/03/24 0552

## 2024-11-04 ENCOUNTER — TELEPHONE (OUTPATIENT)
Dept: GASTROENTEROLOGY | Facility: CLINIC | Age: 64
End: 2024-11-04
Payer: COMMERCIAL

## 2024-11-05 NOTE — PROGRESS NOTES
At the time of your recent upper endoscopy, biopsies were taken of the esophagus.  Biopsies revealed mild chronic inflammation from acid reflux.  Biopsies of the stomach were negative for H. pylori gastritis.  Please continue Protonix.  Your colonoscopy was normal.  Because of your prep quality, you will need repeat colonoscopy sooner than later.  You will need repeat colonoscopy in 3 years.

## 2024-11-05 NOTE — TELEPHONE ENCOUNTER
Please call the patient to give her the following results.  She should be able to find these results on [facility].  Biopsies of the esophagus revealed mild chronic inflammation from acid reflux.  Biopsies of the stomach were negative for H. pylori gastritis.  Please continue Protonix.  Colonoscopy was normal.  Because of your prep quality, you will need repeat colonoscopy sooner than later.  You will need repeat colonoscopy in 3 years.

## 2024-12-05 ENCOUNTER — HOSPITAL ENCOUNTER (OUTPATIENT)
Facility: HOSPITAL | Age: 64
Discharge: HOME | End: 2024-12-05
Admitting: INTERNAL MEDICINE
Payer: COMMERCIAL

## 2024-12-05 DIAGNOSIS — R91.1 PULMONARY NODULE: ICD-10-CM

## 2024-12-05 PROCEDURE — 71260 CT THORAX DX C+: CPT

## 2024-12-05 PROCEDURE — 25510000001 IOPAMIDOL 61 % SOLUTION: Performed by: INTERNAL MEDICINE

## 2024-12-05 RX ORDER — IOPAMIDOL 612 MG/ML
100 INJECTION, SOLUTION INTRAVASCULAR
Status: COMPLETED | OUTPATIENT
Start: 2024-12-05 | End: 2024-12-05

## 2024-12-05 RX ADMIN — IOPAMIDOL 100 ML: 612 INJECTION, SOLUTION INTRAVENOUS at 09:47

## 2024-12-19 ENCOUNTER — TRANSCRIBE ORDERS (OUTPATIENT)
Dept: ADMINISTRATIVE | Facility: HOSPITAL | Age: 64
End: 2024-12-19
Payer: COMMERCIAL

## 2024-12-19 DIAGNOSIS — Z12.31 VISIT FOR SCREENING MAMMOGRAM: Primary | ICD-10-CM

## 2024-12-26 ENCOUNTER — OFFICE VISIT (OUTPATIENT)
Dept: PULMONOLOGY | Facility: CLINIC | Age: 64
End: 2024-12-26
Payer: COMMERCIAL

## 2024-12-26 VITALS
DIASTOLIC BLOOD PRESSURE: 80 MMHG | HEART RATE: 79 BPM | SYSTOLIC BLOOD PRESSURE: 130 MMHG | WEIGHT: 267.4 LBS | HEIGHT: 67 IN | OXYGEN SATURATION: 98 % | BODY MASS INDEX: 41.97 KG/M2 | TEMPERATURE: 96.9 F

## 2024-12-26 DIAGNOSIS — R91.1 PULMONARY NODULE: Primary | ICD-10-CM

## 2024-12-26 DIAGNOSIS — R05.3 CHRONIC COUGH: ICD-10-CM

## 2024-12-26 PROCEDURE — 99214 OFFICE O/P EST MOD 30 MIN: CPT | Performed by: INTERNAL MEDICINE

## 2024-12-26 RX ORDER — ALBUTEROL SULFATE 90 UG/1
2 INHALANT RESPIRATORY (INHALATION) EVERY 4 HOURS PRN
Qty: 8.5 G | Refills: 5 | Status: SHIPPED | OUTPATIENT
Start: 2024-12-26

## 2024-12-26 NOTE — PROGRESS NOTES
Chief Complaint  Pulmonary nodule  Follow-up after CAT scan.  Complaining of cough since she had an EGD and colonoscopy done.  Lauren Klein is a 64 y.o. female who presents today to [facility] for Pulmonary nodule.    HPI:      Patient is a 64-year-old  female who is referred to me for further evaluation of abnormal CAT scan which was done back in April when she had symptoms of cough sputum production and was treated for pneumonia.      Patient had a CAT scan of the chest which showed groundglass nodule which was about 7 mm in the left upper lobe.  I ordered a follow-up CAT scan which was done this month which showed resolution of groundglass nodule.    Few bibasilar atelectasis noted.    Patient reported that she has been having cough since she had an EGD and upper GI endoscopy done.  She reported that her cough gets worse when she gets exposed to strong perfumes or cold wind.    She has a very strong family history of asthma.  Her son and mother has asthma.  She also reported seasonal variation and coughing spell.  She reported occasional wheezing.   Denies rash.  Denies sick contact, exposure to birds and recent travel.           Previous History:   Past Medical History:   Diagnosis Date    Arthritis     Colitis     GERD (gastroesophageal reflux disease)     Hypertension 2022    PONV (postoperative nausea and vomiting)       Past Surgical History:   Procedure Laterality Date    CHOLECYSTECTOMY      COLONOSCOPY      COLONOSCOPY N/A 10/30/2024    Procedure: COLONOSCOPY FOR SCREENING;  Surgeon: Heaven Aguila MD;  Location: [facility];  Service: Gastroenterology;  Laterality: N/A;    ENDOSCOPY      ENDOSCOPY N/A 10/30/2024    Procedure: ESOPHAGOGASTRODUODENOSCOPY WITH BIOPSY;  Surgeon: Heaven Aguila MD;  Location: [facility];  Service: Gastroenterology;  Laterality: N/A;  Patient dilated to 20 mm per MD Guzmán    HYSTERECTOMY       "age 50    TONSILLECTOMY        Social History     Socioeconomic History    Marital status:    Tobacco Use    Smoking status: Former     Current packs/day: 0.00     Types: Cigarettes     Quit date:      Years since quittin.0     Passive exposure: Past    Smokeless tobacco: Never    Tobacco comments:     Smoked on and off very little short period of time years ago at least 35 years ago   Vaping Use    Vaping status: Never Used   Substance and Sexual Activity    Alcohol use: No    Drug use: No    Sexual activity: Yes     Partners: Male     Birth control/protection: Vasectomy, Hysterectomy        Current Medications:  Current Outpatient Medications   Medication Sig Dispense Refill    calcium polycarbophil (FiberCon) 625 MG tablet Take 2 tablets by mouth daily 60 tablet 3    losartan (COZAAR) 25 MG tablet Take 1 tablet by mouth Daily.      ondansetron (Zofran) 4 MG tablet Take 1 tablet by mouth Every 8 (Eight) Hours As Needed for Nausea or Vomiting. 12 tablet 0    pantoprazole (PROTONIX) 40 MG EC tablet Take 1 tablet by mouth Daily. 30 tablet 3    albuterol sulfate HFA (Ventolin HFA) 108 (90 Base) MCG/ACT inhaler Inhale 2 puffs Every 4 (Four) Hours As Needed for Wheezing. 8.5 g 5    sennosides-docusate (senna-docusate sodium) 8.6-50 MG per tablet Take 1 tablet by mouth Daily. 30 tablet 0     No current facility-administered medications for this visit.       Allergies:   Allergies   Allergen Reactions    Dilaudid [Hydromorphone] Swelling     SWELLING AND NAUSEA    Sulfa Antibiotics Rash       Vitals:   /80   Pulse 79   Temp 96.9 °F (36.1 °C)   Ht 170.2 cm (67.01\")   Wt 121 kg (267 lb 6.4 oz)   SpO2 98%   BMI 41.87 kg/m²     Estimated body mass index is 41.87 kg/m² as calculated from the following:    Height as of this encounter: 170.2 cm (67.01\").    Weight as of this encounter: 121 kg (267 lb 6.4 oz).    Lauren Klein  reports that she quit smoking about 28 years ago. Her smoking use included " cigarettes. She has been exposed to tobacco smoke. She has never used smokeless tobacco.          Physical Exam:   Physical Exam  Vitals reviewed.   Constitutional:       Appearance: Normal appearance. She is obese.      Interventions: She is not intubated.  HENT:      Head: Normocephalic.      Nose: Nose normal.      Mouth/Throat:      Mouth: Mucous membranes are moist.   Eyes:      Extraocular Movements: Extraocular movements intact.      Conjunctiva/sclera: Conjunctivae normal.      Pupils: Pupils are equal, round, and reactive to light.   Cardiovascular:      Rate and Rhythm: Normal rate.      Heart sounds: No murmur heard.     No friction rub. No gallop.   Pulmonary:      Effort: Pulmonary effort is normal. No tachypnea, bradypnea, accessory muscle usage, prolonged expiration, respiratory distress or retractions. She is not intubated.      Breath sounds: No stridor, decreased air movement or transmitted upper airway sounds. Wheezing (Occasional end expiratory wheezing only on forced exhalation otherwise clear to auscultation) present.   Abdominal:      General: There is no distension.      Palpations: Abdomen is soft. There is no mass.      Tenderness: There is no abdominal tenderness. There is no right CVA tenderness, left CVA tenderness, guarding or rebound.      Hernia: No hernia is present.   Skin:     Coloration: Skin is not jaundiced.      Findings: No erythema.   Neurological:      General: No focal deficit present.      Mental Status: She is alert and oriented to person, place, and time.   Psychiatric:         Mood and Affect: Mood normal.          Procedures     STOP-Bang Score:     [place] Sleepiness Scale:       Lab Results:   Admission on 11/03/2024, Discharged on 11/03/2024   Component Date Value Ref Range Status    Glucose 11/03/2024 123 (H)  65 - 99 mg/dL Final    BUN 11/03/2024 13  8 - 23 mg/dL Final    Creatinine 11/03/2024 0.53 (L)  0.57 - 1.00 mg/dL Final    Sodium 11/03/2024 137  136 - 145  mmol/L Final    Potassium 11/03/2024 3.8  3.5 - 5.2 mmol/L Final    Chloride 11/03/2024 103  98 - 107 mmol/L Final    CO2 11/03/2024 22.2  22.0 - 29.0 mmol/L Final    Calcium 11/03/2024 9.4  8.6 - 10.5 mg/dL Final    Total Protein 11/03/2024 7.0  6.0 - 8.5 g/dL Final    Albumin 11/03/2024 3.9  3.5 - 5.2 g/dL Final    ALT (SGPT) 11/03/2024 21  1 - 33 U/L Final    AST (SGOT) 11/03/2024 22  1 - 32 U/L Final    Alkaline Phosphatase 11/03/2024 64  39 - 117 U/L Final    Total Bilirubin 11/03/2024 0.4  0.0 - 1.2 mg/dL Final    Globulin 11/03/2024 3.1  gm/dL Final    A/G Ratio 11/03/2024 1.3  g/dL Final    BUN/Creatinine Ratio 11/03/2024 24.5  7.0 - 25.0 Final    Anion Gap 11/03/2024 11.8  5.0 - 15.0 mmol/L Final    eGFR 11/03/2024 104.7  >60.0 mL/min/1.73 Final    Lipase 11/03/2024 23  13 - 60 U/L Final    QT Interval 11/03/2024 376  ms Final    QTC Interval 11/03/2024 447  ms Final    Extra Tube 11/03/2024 Hold for add-ons.   Final    Auto resulted.    Extra Tube 11/03/2024 hold for add-on   Final    Auto resulted    Extra Tube 11/03/2024 Hold for add-ons.   Final    Auto resulted.    Extra Tube 11/03/2024 Hold for add-ons.   Final    Auto resulted.    Extra Tube 11/03/2024 Hold for add-ons.   Final    Auto resulted    WBC 11/03/2024 11.27 (H)  3.40 - 10.80 10*3/mm3 Final    RBC 11/03/2024 4.93  3.77 - 5.28 10*6/mm3 Final    Hemoglobin 11/03/2024 14.5  12.0 - 15.9 g/dL Final    Hematocrit 11/03/2024 44.9  34.0 - 46.6 % Final    MCV 11/03/2024 91.1  79.0 - 97.0 fL Final    MCH 11/03/2024 29.4  26.6 - 33.0 pg Final    MCHC 11/03/2024 32.3  31.5 - 35.7 g/dL Final    RDW 11/03/2024 13.0  12.3 - 15.4 % Final    RDW-SD 11/03/2024 44.2  37.0 - 54.0 fl Final    MPV 11/03/2024 9.6  6.0 - 12.0 fL Final    Platelets 11/03/2024 240  140 - 450 10*3/mm3 Final    Neutrophil % 11/03/2024 77.2 (H)  42.7 - 76.0 % Final    Lymphocyte % 11/03/2024 14.0 (L)  19.6 - 45.3 % Final    Monocyte % 11/03/2024 7.6  5.0 - 12.0 % Final    Eosinophil %  2024 0.5  0.3 - 6.2 % Final    Basophil % 2024 0.4  0.0 - 1.5 % Final    Immature Grans % 2024 0.3  0.0 - 0.5 % Final    Neutrophils, Absolute 2024 8.70 (H)  1.70 - 7.00 10*3/mm3 Final    Lymphocytes, Absolute 2024 1.58  0.70 - 3.10 10*3/mm3 Final    Monocytes, Absolute 2024 0.86  0.10 - 0.90 10*3/mm3 Final    Eosinophils, Absolute 2024 0.06  0.00 - 0.40 10*3/mm3 Final    Basophils, Absolute 2024 0.04  0.00 - 0.20 10*3/mm3 Final    Immature Grans, Absolute 2024 0.03  0.00 - 0.05 10*3/mm3 Final    nRBC 2024 0.0  0.0 - 0.2 /100 WBC Final    Color, UA 2024 Yellow  Yellow, Straw Final    Appearance, UA 2024 Clear  Clear Final    pH, UA 2024 6.0  5.0 - 8.0 Final    Specific [place], UA 2024 1.023  1.005 - 1.030 Final    Glucose, UA 2024 Negative  Negative Final    Ketones, UA 2024 Negative  Negative Final    Bilirubin, UA 2024 Negative  Negative Final    Blood, UA 2024 Negative  Negative Final    Protein, UA 2024 Negative  Negative Final    Leuk Esterase, UA 2024 Negative  Negative Final    Nitrite, UA 2024 Negative  Negative Final    Urobilinogen, UA 2024 0.2 E.U./dL  0.2 - 1.0 E.U./dL Final   Admission on 10/30/2024, Discharged on 10/30/2024   Component Date Value Ref Range Status    Reference Lab Report 10/30/2024    Final                    Value:[facility]  [address] [place] [address]  Phone: [phone] or [phone]  Fax: [phone]  Joshua Morse M.D., Medical Director    PATIENT NAME                           LABORATORY NO.  [id number]  ANATOLY MONSIVAIS                        [id number]  [id number]                         AGE              SEX  N           CLIENT REF #  [facility]              64      1960  F    [id number]   [id number]    [address]                     REQUESTING M.D.     ATTENDING M.D.     COPY  [place] [address]                   BENNY HOOKER  DATE COLLECTED      DATE RECEIVED      DATE REPORTED  10/30/2024          10/30/2024         11/02/2024    DIAGNOSIS:  A.   ANTRUM, BIOPSY:  Mild chronic gastritis  No intestinal metaplasia or dysplasia identified  No Helicobacter pylori-like organisms identified on routine histologic sections  B.   ESOPHAGUS, BIOPSY, DISTAL:  Reactive esophageal mucosa with scant chronic inflammation  No                           intestinal metaplasia, dysplasia or increased eosinophilic  inflammation identified    CAM    CLINICAL HISTORY:  Personal history of colonic polyps, chronic idiopathic constipation, diarrhea,  generalized abdominal pain, esophageal dysphagia, gastroesophageal reflux  disease, hematochezia    SPECIMENS RECEIVED:  A.  ANTRUM, BIOPSY  B.  ESOPHAGUS, BIOPSY, DISTAL    MICROSCOPIC DESCRIPTION:  Tissue blocks are prepared and slides are examined microscopically on all  specimens. See diagnosis for details.    Professional interpretation rendered by Daria Lee M.D., [place] at  [facility], [address] [place] [address].    GROSS DESCRIPTION:  A.  Labeled \"antrum biopsy\" are 2 portions of tan soft tissue measuring 0.5 x  0.4 x 0.2 cm in aggregate, submitted entirely 1 block.  MTS  B.  Labeled \"distal esophagus biopsy\" are multiple portions of tan soft tissue  measuring 0.5 x 0.5 x 0.2 cm in aggregate, submitted entirely 1 block.    REVIEWED, DIAGNOSED AND ELECTRONICALLY  SIGNED                           BY:    Daria Lee M.D., MARIA VICTORIA.  CPT CODES:  88305x2         Lab Results (last 72 hours)       ** No results found for the last 72 hours. **          WBC No results found for: \"WBC\"   HGB No results found for: \"HGB\"   HCT No results found for: \"HCT\"   Platlets No results found for: \"LABPLAT\"     PT/INR:  No results found for: \"PROTIME\"/No results found for: \"INR\"    Sodium No results found for: \"NA\"   Potassium No results found for: " "\"K\"   Chloride No results found for: \"CL\"   Bicarbonate No results found for: \"PLASMABICARB\"   BUN No results found for: \"BUN\"   Creatinine No results found for: \"CREATININE\"   Calcium No results found for: \"CALCIUM\"   Magnesium No results found for: \"MG\"     pH No results found for: \"PHART\"   pO2 No results found for: \"PO2ART\"   pCO2 No results found for: \"NLP9ZGV\"   HCO3 No results found for: \"EAS3DDC\"           Radiology Review:   No results found for this or any previous visit.     No results found for this or any previous visit.    No results found for this or any previous visit.    No results found for this or any previous visit.    Results for orders placed during the hospital encounter of 12/05/24    CT Chest With Contrast Diagnostic    Narrative  PROCEDURE: CT CHEST W CONTRAST DIAGNOSTIC-    HISTORY: f/u on pulmonary nodule; R91.1-Solitary pulmonary nodule    COMPARISON: 4/25/2024.    PROCEDURE: Multiple axial CT images were obtained from the thoracic  inlet through the upper abdomen following the administration of  Isovue-300 intravenous contrast. This study was performed with  techniques to keep radiation doses as low as reasonably achievable  (ALARA). Individualized dose reduction techniques using automated  exposure control or adjustment of mA and/or kV according to the patient  size were employed.    FINDINGS:  Soft tissue windows demonstrate no adenopathy within the chest. The  thyroid gland is unremarkable. The heart is normal in size. The aorta is  normal in caliber. There are no pleural or pericardial effusions. Lung  windows reveal no suspicious infiltrate or nodules . The previously  noted groundglass opacities in the left lung are no longer seen. There  is bibasilar atelectasis. In the visualized upper abdomen note is made  of fatty infiltration of the liver. Bone windows reveal no acute osseous  abnormalities.    Impression  Interval resolution of the previously noted groundglass  opacities in " the left lung which were likely infectious or inflammatory  in nature.      This report was finalized on 12/5/2024 9:54 AM by Margret Vogel M.D..    No results found for this or any previous visit.     No results found for this or any previous visit.    No results found for this or any previous visit.    No results found for this or any previous visit.                  Results Review: I reviewed the patient's new clinical results.    Assessment and Plan    Visit Diagnosis:     ICD-10-CM ICD-9-CM   1. Pulmonary nodule  R91.1 793.11   2. Chronic cough  R05.3 786.2      Resolved 7 mm groundglass nodule.    Highly suspect cough variant asthma.  Will get a spirometry.  I will put her on albuterol inhaler 2 puffs every 4 hours as needed.    Return to clinic in 6 months, early as needed.  New Medications:   New Medications Ordered This Visit   Medications    albuterol sulfate HFA (Ventolin HFA) 108 (90 Base) MCG/ACT inhaler     Sig: Inhale 2 puffs Every 4 (Four) Hours As Needed for Wheezing.     Dispense:  8.5 g     Refill:  5       Discontinued Medications:   There are no discontinued medications.         Follow Up:       Patient was given instructions and counseling regarding her condition. Please see specific information pulled into the AVS if appropriate.       This document has been electronically signed by Tylor Houston MD   December 26, 2024 14:50 EST    Dictated Utilizing Dragon Dictation: Part of this note may be an electronic transcription/translation of spoken language to printed text using the Dragon Dictation System.

## 2025-02-10 ENCOUNTER — HOSPITAL ENCOUNTER (OUTPATIENT)
Dept: MAMMOGRAPHY | Facility: HOSPITAL | Age: 65
Discharge: HOME | End: 2025-02-10
Admitting: NURSE PRACTITIONER
Payer: COMMERCIAL

## 2025-02-10 DIAGNOSIS — Z12.31 VISIT FOR SCREENING MAMMOGRAM: ICD-10-CM

## 2025-02-10 PROCEDURE — 77063 BREAST TOMOSYNTHESIS BI: CPT | Performed by: RADIOLOGY

## 2025-02-10 PROCEDURE — 77067 SCR MAMMO BI INCL CAD: CPT

## 2025-02-10 PROCEDURE — 77063 BREAST TOMOSYNTHESIS BI: CPT

## 2025-02-10 PROCEDURE — 77067 SCR MAMMO BI INCL CAD: CPT | Performed by: RADIOLOGY

## 2025-02-21 ENCOUNTER — HOSPITAL ENCOUNTER (OUTPATIENT)
Dept: GENERAL RADIOLOGY | Facility: HOSPITAL | Age: 65
Discharge: HOME | End: 2025-02-21
Admitting: NURSE PRACTITIONER
Payer: COMMERCIAL

## 2025-02-21 DIAGNOSIS — M25.561 RIGHT KNEE PAIN, UNSPECIFIED CHRONICITY: ICD-10-CM

## 2025-02-21 DIAGNOSIS — M25.561 RIGHT KNEE PAIN, UNSPECIFIED CHRONICITY: Primary | ICD-10-CM

## 2025-02-21 PROCEDURE — 73564 X-RAY EXAM KNEE 4 OR MORE: CPT

## 2025-02-28 ENCOUNTER — HOSPITAL ENCOUNTER (OUTPATIENT)
Dept: RESPIRATORY THERAPY | Facility: HOSPITAL | Age: 65
Discharge: HOME | End: 2025-02-28
Payer: MEDICARE

## 2025-02-28 DIAGNOSIS — R05.3 CHRONIC COUGH: ICD-10-CM

## 2025-02-28 PROCEDURE — 94729 DIFFUSING CAPACITY: CPT

## 2025-02-28 PROCEDURE — 94726 PLETHYSMOGRAPHY LUNG VOLUMES: CPT

## 2025-02-28 PROCEDURE — 94060 EVALUATION OF WHEEZING: CPT

## 2025-04-23 ENCOUNTER — OFFICE VISIT (OUTPATIENT)
Dept: GASTROENTEROLOGY | Facility: CLINIC | Age: 65
End: 2025-04-23
Payer: COMMERCIAL

## 2025-04-23 VITALS
HEIGHT: 67 IN | WEIGHT: 268 LBS | HEART RATE: 82 BPM | SYSTOLIC BLOOD PRESSURE: 144 MMHG | DIASTOLIC BLOOD PRESSURE: 79 MMHG | BODY MASS INDEX: 42.06 KG/M2

## 2025-04-23 DIAGNOSIS — K76.0 METABOLIC DYSFUNCTION-ASSOCIATED STEATOTIC LIVER DISEASE (MASLD): ICD-10-CM

## 2025-04-23 DIAGNOSIS — R19.8 ALTERNATING CONSTIPATION AND DIARRHEA: ICD-10-CM

## 2025-04-23 DIAGNOSIS — K59.04 CHRONIC IDIOPATHIC CONSTIPATION: Primary | ICD-10-CM

## 2025-04-23 DIAGNOSIS — K21.9 GASTROESOPHAGEAL REFLUX DISEASE, UNSPECIFIED WHETHER ESOPHAGITIS PRESENT: ICD-10-CM

## 2025-04-23 DIAGNOSIS — R13.19 ESOPHAGEAL DYSPHAGIA: Primary | ICD-10-CM

## 2025-04-23 PROCEDURE — 99214 OFFICE O/P EST MOD 30 MIN: CPT | Performed by: NURSE PRACTITIONER

## 2025-04-23 RX ORDER — METFORMIN HYDROCHLORIDE 500 MG/1
500 TABLET, EXTENDED RELEASE ORAL
COMMUNITY
Start: 2025-03-07

## 2025-04-23 RX ORDER — PANTOPRAZOLE SODIUM 40 MG/1
40 TABLET, DELAYED RELEASE ORAL DAILY
Qty: 30 TABLET | Refills: 5 | Status: SHIPPED | OUTPATIENT
Start: 2025-04-23

## 2025-04-23 NOTE — PROGRESS NOTES
DATE:  4/23/2025    REASON FOR FOLLOW UP: Constipation, GERD, dysphagia     REFERRING PHYSICIAN:      Mare Cool APRN     CHIEF COMPLAINT:  Follow-up EGD/colonoscopy    HISTORY OF PRESENT ILLNESS:   Lauren Klein is a very pleasant 64 y.o. female who is being seen today at the request of Mare Cool APRN  for evaluation and treatment of hematochezia and personal history of colon polyps.  Patient reports she previously followed with Dr. Merino for several years and had two prior colonoscopies.  Patient reports having her first colonoscopy ~15 years ago and was reportedly found to have several ulcerations throughout her colon and 6 colon polyps removed.  Patient reports being told she had ulcerative colitis and took an oral medication for a couple of years.  She is unsure of which medication she was taking.  Patient reports having repeat colonoscopy approximately 2 years later which was unremarkable and subsequently stopped taking this oral medication for UC.  Patient reports clinically she had been doing well up until~2 months ago.  She has recently been struggling with diarrhea alternating with constipation.  She reports having more diarrhea than constipation and has 1-5 BMs per day with stool type IV or VI on [place] stool scale.  She often feels bloated and has incomplete evacuation of her colon.  She has also been having hematochezia at least twice per week for the past couple of months.  She has associated generalized abdominal pain which occurs at random.  Patient reports having 3 maternal first cousins with colon cancer.  She is without first-degree relatives with colon cancer.  Patient also complains of chronic GERD.  She previously took Protonix 40 mg p.o. daily which worked well.  However, she discontinued due to concern of potential side effects.  Of note, she is s/p cholecystectomy.  She is currently having 3-4 flares per week with burning in her throat/chest and regurgitation.  She  has intermittent epigastric pain.  She complains of intermittent dysphagia particularly with solids (rice and steak).  She also reports history of esophageal stricture requiring dilation in the past x 2.  She denies having nausea or vomiting.  Denies weight loss.  She has no other complaints today.    INTERVAL HISTORY:  Ms. Klein presents today for follow-up.  Since her last visit, she underwent EGD/colonoscopy with Dr. Guzmán on 10/30/2024.  EGD notable for benign-appearing, mild stenosis at the gastroesophageal junction.  The stenosis was transversed.  Dilation was performed to 20 mm.  The dilation site was examined and showed mild mucosal disruption.  Biopsies of the esophagus revealed mild chronic inflammation from acid reflux.  Also noted a small hiatal hernia and two nonbleeding superficial gastric ulcers.  Biopsies of the stomach were negative for H. pylori gastritis.  Following EGD, she was continued on Protonix 40 mg p.o. daily.  With colonoscopy, preparation of the colon was fair.  The entire examined colon appeared normal aside from mild internal hemorrhoids.  Repeat colonoscopy was recommended in 3 years for surveillance due to poor colon prep.  At present, overall clinically she is doing well.  She reports GERD is well-controlled with Protonix 40 mg p.o. daily.  Happily, dysphagia has resolved.  In regards to constipation, she reports previously trying fiber with MiraLAX without improvement.  At present, her bowels move every other day with stool type V-VI on [place] stool scale.  She will occasionally have stool type I on [place] stool scale.  She has associated abdominal bloating and incomplete evacuation of her colon.  Patient reports she did have some rectal bleeding following colonoscopy.  She was evaluated in the ED and underwent CT imaging.  GI tract was unremarkable.  Patient reports she has been following with pulmonology and is being treated for suspected asthma.  She has no other complaints  today.    PAST MEDICAL HISTORY:  Past Medical History:   Diagnosis Date    Arthritis     Colitis     GERD (gastroesophageal reflux disease)     Hypertension     PONV (postoperative nausea and vomiting)        PAST SURGICAL HISTORY:  Past Surgical History:   Procedure Laterality Date    CHOLECYSTECTOMY      COLONOSCOPY      COLONOSCOPY N/A 10/30/2024    Procedure: COLONOSCOPY FOR SCREENING;  Surgeon: Heaven Aguila MD;  Location: [facility];  Service: Gastroenterology;  Laterality: N/A;    ENDOSCOPY      ENDOSCOPY N/A 10/30/2024    Procedure: ESOPHAGOGASTRODUODENOSCOPY WITH BIOPSY;  Surgeon: Heaven Aguila MD;  Location: [facility];  Service: Gastroenterology;  Laterality: N/A;  Patient dilated to 20 mm per MD Guzmán    HYSTERECTOMY      age 50    TONSILLECTOMY         FAMILY HISTORY:  Family History   Problem Relation Age of Onset    Hypertension Mother     Asthma Mother     Hypertension Father     Hypertension Brother     Cancer Maternal Grandmother     Heart disease Paternal Grandfather     Breast cancer Neg Hx        SOCIAL HISTORY:  Social History     Socioeconomic History    Marital status:    Tobacco Use    Smoking status: Former     Current packs/day: 0.00     Types: Cigarettes     Quit date:      Years since quittin.3     Passive exposure: Past    Smokeless tobacco: Never    Tobacco comments:     Smoked on and off very little short period of time years ago at least 35 years ago   Vaping Use    Vaping status: Never Used   Substance and Sexual Activity    Alcohol use: No    Drug use: No    Sexual activity: Yes     Partners: Male     Birth control/protection: Vasectomy, Hysterectomy     MEDICATIONS:  The current medication list was reviewed in the EMR    Current Outpatient Medications:     metFORMIN ER (GLUCOPHAGE-XR) 500 MG 24 hr tablet, Take 1 tablet by mouth Daily With Breakfast., Disp: , Rfl:     albuterol sulfate HFA (Ventolin HFA) 108 (90 Base) MCG/ACT inhaler,  "Inhale 2 puffs Every 4 (Four) Hours As Needed for Wheezing., Disp: 8.5 g, Rfl: 5    calcium polycarbophil (FiberCon) 625 MG tablet, Take 2 tablets by mouth daily, Disp: 60 tablet, Rfl: 3    losartan (COZAAR) 25 MG tablet, Take 1 tablet by mouth Daily., Disp: , Rfl:     ondansetron (Zofran) 4 MG tablet, Take 1 tablet by mouth Every 8 (Eight) Hours As Needed for Nausea or Vomiting., Disp: 12 tablet, Rfl: 0    pantoprazole (PROTONIX) 40 MG EC tablet, Take 1 tablet by mouth Daily., Disp: 30 tablet, Rfl: 3    ALLERGIES:    Allergies   Allergen Reactions    Dilaudid [Hydromorphone] Swelling     SWELLING AND NAUSEA    Sulfa Antibiotics Rash     REVIEW OF SYSTEMS:    A comprehensive 14 point review of systems was performed.  Significant findings as mentioned above.  All other systems reviewed and are negative.      Physical Exam   Vital Signs: /79 (BP Location: Left arm, Patient Position: Sitting, Cuff Size: Large Adult)   Pulse 82   Ht 170.2 cm (67.01\")   Wt 122 kg (268 lb)   BMI 41.96 kg/m²    General: Obese, alert and oriented x 3, in no acute distress.   Head: ATNC   Eyes: PERRL, No evidence of conjunctivitis.   Nose: No nasal discharge.   Mouth: Oral mucosal membranes moist. No oral ulceration or hemorrhages.   Neck: Neck supple. No thyromegaly. No JVD.   Lungs: Clear in all fields to A&P without rales, rhonchi or wheezing.   Heart: Regular rate and rhythm. No murmurs, rubs, or gallops.   Abdomen: Soft. Bowel sounds are normoactive. Nontender with palpation.  Extremities: No cyanosis or edema.  Neurologic: Grossly non-focal exam    RECENT LABS:  Lab Results   Component Value Date    WBC 11.27 (H) 11/03/2024    HGB 14.5 11/03/2024    HCT 44.9 11/03/2024    MCV 91.1 11/03/2024    RDW 13.0 11/03/2024     11/03/2024    NEUTRORELPCT 77.2 (H) 11/03/2024    LYMPHORELPCT 14.0 (L) 11/03/2024    MONORELPCT 7.6 11/03/2024    EOSRELPCT 0.5 11/03/2024    BASORELPCT 0.4 11/03/2024    NEUTROABS 8.70 (H) 11/03/2024    " LYMPHSABS 1.58 11/03/2024       Lab Results   Component Value Date     11/03/2024    K 3.8 11/03/2024    CO2 22.2 11/03/2024     11/03/2024    BUN 13 11/03/2024    CREATININE 0.53 (L) 11/03/2024    GLUCOSE 123 (H) 11/03/2024    CALCIUM 9.4 11/03/2024    ALKPHOS 64 11/03/2024    AST 22 11/03/2024    ALT 21 11/03/2024    BILITOT 0.4 11/03/2024    ALBUMIN 3.9 11/03/2024    PROTEINTOT 7.0 11/03/2024       ASSESSMENT & PLAN:  Lauren Klein is a very pleasant 64 y.o. female with    1.  GERD:  2.  Dysphagia (now resolved):  3.  S/p cholecystectomy:    -She underwent EGD with Dr. Guzmán on 10/30/2024.  EGD notable for benign-appearing, mild stenosis at the gastroesophageal junction.  The stenosis was transversed.  Dilation was performed to 20 mm.  The dilation site was examined and showed mild mucosal disruption.  Biopsies of the esophagus revealed mild chronic inflammation from acid reflux.  Also noted a small hiatal hernia and two nonbleeding superficial gastric ulcers.  Biopsies of the stomach were negative for H. pylori gastritis.  We reviewed findings today.  -Continue Protonix 40 mg p.o. daily which is controlling GERD well.  Refills provided.  Happily, dysphagia has resolved.  Will monitor.  -We have discussed lifestyle changes including weight loss and important dietary modifications, limiting triggers such as caffeine, chocolate, spicy foods, acidic foods, fried/greasy foods, food with high fat content and carbonated beverages.    4. Alternating constipation and diarrhea:  5.  ?  History of UC:    -She underwent repeat colonoscopy with Dr. Guzmán on 10/30/2024 and preparation of the colon was fair.  The entire examined colon appeared normal aside from mild internal hemorrhoids.  Repeat colonoscopy was recommended in 3 years for surveillance due to poor colon prep.  We reviewed findings today.  - Previously attempted to get records from Dr. Merino including previous colonoscopy/pathology but did not  receive.  As above, she has questionable history of UC.  However, discussed with patient this was not noted on recent colonoscopy.  -She has previously tried fiber with MiraLAX without improvement in bowel habits.  Therefore, recommended trial of Linzess.  Patient would prefer to start with lowest dose.  Rx provided for Linzess 72 mcg p.o. daily.  She can continue with a daily fiber supplement to help bulk stools and improve bowel movements.  -We have also previously provided her with handout for low FODMAP diet.    6. MASLD:  - Fatty infiltration of the liver was noted on CT imaging in November 2024.  Her LFTs were normal.  Will discuss further in upcoming appointment.    Will have patient return to clinic in 3 months for symptom check.    The patient was in agreement with the plan and all questions were answered to his satisfaction.     Thank you so much for allowing us to participate in the care of Lauren Klein . Please do not hesitate to contact us with any questions or concerns.           Electronically Signed by: YNES Frank , April 23, 2025 09:00 EDT       CC:   Mare Cool APRN

## 2025-07-09 ENCOUNTER — OFFICE VISIT (OUTPATIENT)
Dept: PULMONOLOGY | Facility: CLINIC | Age: 65
End: 2025-07-09
Payer: MEDICARE

## 2025-07-09 VITALS
HEART RATE: 69 BPM | OXYGEN SATURATION: 96 % | WEIGHT: 266.2 LBS | SYSTOLIC BLOOD PRESSURE: 122 MMHG | HEIGHT: 67 IN | DIASTOLIC BLOOD PRESSURE: 80 MMHG | BODY MASS INDEX: 41.78 KG/M2

## 2025-07-09 DIAGNOSIS — R05.3 CHRONIC COUGH: ICD-10-CM

## 2025-07-09 DIAGNOSIS — R91.1 PULMONARY NODULE: Primary | ICD-10-CM

## 2025-07-09 PROCEDURE — 1159F MED LIST DOCD IN RCRD: CPT | Performed by: INTERNAL MEDICINE

## 2025-07-09 PROCEDURE — 99213 OFFICE O/P EST LOW 20 MIN: CPT | Performed by: INTERNAL MEDICINE

## 2025-07-09 PROCEDURE — 1160F RVW MEDS BY RX/DR IN RCRD: CPT | Performed by: INTERNAL MEDICINE

## 2025-07-09 NOTE — PROGRESS NOTES
Chief Complaint  Pulmonary nodule    Lauren Klein is a 64 y.o. female who presents today to [facility] for Pulmonary nodule.    HPI:      Patient is a 64-year-old  female with history of chronic cough, pulmonary nodule.  She was referred to me for further evaluation of abnormal CAT scan which was done back in April 2024 when she had symptoms of cough sputum production and was treated for pneumonia.      Patient had a CAT scan of the chest which showed groundglass nodule which was about 7 mm in the left upper lobe.     Follow-up CAT scan December 2024 showed interval resolution of the previously noted groundglass  opacities in the left lung which were likely infectious or inflammatory  in nature.  Few bibasilar atelectasis noted.  Patient reported mild chronic cough.  No significant use of rescue inhaler.    She had a spirometry done which showed normal FEV1 and FVC ratio.    She has a very strong family history of asthma.  Her son and mother has asthma.  She also reported seasonal variation and coughing spell.  She reported occasional wheezing.   Denies rash.  Denies sick contact, exposure to birds and recent travel.  Previous History:   Past Medical History:   Diagnosis Date    Arthritis     Colitis     Colon polyp 2010    had several    Crohn's disease 2010    they said i had beginning stages but not sure    GERD (gastroesophageal reflux disease)     Hypertension 2022    Irritable bowel syndrome 2010    PONV (postoperative nausea and vomiting)     Ulcer 2010      Past Surgical History:   Procedure Laterality Date    CHOLECYSTECTOMY      COLONOSCOPY      COLONOSCOPY N/A 10/30/2024    Procedure: COLONOSCOPY FOR SCREENING;  Surgeon: Heaven Aguila MD;  Location: [facility];  Service: Gastroenterology;  Laterality: N/A;    ENDOSCOPY      ENDOSCOPY N/A 10/30/2024    Procedure: ESOPHAGOGASTRODUODENOSCOPY WITH BIOPSY;  Surgeon: Ayla  "Heaven DAMIAN MD;  Location: [facility];  Service: Gastroenterology;  Laterality: N/A;  Patient dilated to 20 mm per MD Guzmán    HYSTERECTOMY  2010    age 50    TONSILLECTOMY      UPPER GASTROINTESTINAL ENDOSCOPY        Social History     Socioeconomic History    Marital status:    Tobacco Use    Smoking status: Former     Current packs/day: 0.00     Types: Cigarettes     Quit date:      Years since quittin.5     Passive exposure: Past    Smokeless tobacco: Never    Tobacco comments:     Smoked on and off very little short period of time years ago at least 35 years ago   Vaping Use    Vaping status: Never Used   Substance and Sexual Activity    Alcohol use: No    Drug use: No    Sexual activity: Yes     Partners: Male     Birth control/protection: Vasectomy, Hysterectomy        Current Medications:  Current Outpatient Medications   Medication Sig Dispense Refill    albuterol sulfate HFA (Ventolin HFA) 108 (90 Base) MCG/ACT inhaler Inhale 2 puffs Every 4 (Four) Hours As Needed for Wheezing. 8.5 g 5    calcium polycarbophil (FiberCon) 625 MG tablet Take 2 tablets by mouth daily 60 tablet 3    linaclotide (Linzess) 72 MCG capsule capsule Take 1 capsule by mouth Every Morning Before Breakfast. Wait 30 mins before eating. 30 capsule 5    losartan (COZAAR) 25 MG tablet Take 1 tablet by mouth Daily.      metFORMIN ER (GLUCOPHAGE-XR) 500 MG 24 hr tablet Take 1 tablet by mouth Daily With Breakfast.      ondansetron (Zofran) 4 MG tablet Take 1 tablet by mouth Every 8 (Eight) Hours As Needed for Nausea or Vomiting. 12 tablet 0    pantoprazole (PROTONIX) 40 MG EC tablet Take 1 tablet by mouth Daily. 30 tablet 5     No current facility-administered medications for this visit.       Allergies:   Allergies   Allergen Reactions    Dilaudid [Hydromorphone] Swelling     SWELLING AND NAUSEA    Sulfa Antibiotics Rash       Vitals:   /80   Pulse 69   Ht 170.2 cm (67\")   Wt 121 kg (266 lb 3.2 oz)   SpO2 96%   BMI " "41.69 kg/m²     Estimated body mass index is 41.69 kg/m² as calculated from the following:    Height as of this encounter: 170.2 cm (67\").    Weight as of this encounter: 121 kg (266 lb 3.2 oz).    Lauren Klein  reports that she quit smoking about 28 years ago. Her smoking use included cigarettes. She has been exposed to tobacco smoke. She has never used smokeless tobacco.          Physical Exam:   Physical Exam  Vitals reviewed.   Constitutional:       Appearance: Normal appearance. She is obese.      Interventions: She is not intubated.  HENT:      Head: Normocephalic.      Nose: Nose normal.      Mouth/Throat:      Mouth: Mucous membranes are moist.   Eyes:      Extraocular Movements: Extraocular movements intact.      Conjunctiva/sclera: Conjunctivae normal.      Pupils: Pupils are equal, round, and reactive to light.   Cardiovascular:      Rate and Rhythm: Normal rate.      Heart sounds: No murmur heard.     No friction rub. No gallop.   Pulmonary:      Effort: Pulmonary effort is normal. No tachypnea, bradypnea, accessory muscle usage, prolonged expiration, respiratory distress or retractions. She is not intubated.      Breath sounds: Normal breath sounds. No stridor, decreased air movement or transmitted upper airway sounds. No wheezing or rales.   Abdominal:      General: There is no distension.      Palpations: Abdomen is soft. There is no mass.      Tenderness: There is no abdominal tenderness. There is no right CVA tenderness, left CVA tenderness, guarding or rebound.      Hernia: No hernia is present.   Skin:     Coloration: Skin is not jaundiced.      Findings: No erythema.   Neurological:      General: No focal deficit present.      Mental Status: She is alert and oriented to person, place, and time.   Psychiatric:         Mood and Affect: Mood normal.          Procedures     STOP-Bang Score:     [place] Sleepiness Scale:       Lab Results:   No visits with results within 3 Month(s) from this visit. "   Latest known visit with results is:   Admission on 11/03/2024, Discharged on 11/03/2024   Component Date Value Ref Range Status    Glucose 11/03/2024 123 (H)  65 - 99 mg/dL Final    BUN 11/03/2024 13  8 - 23 mg/dL Final    Creatinine 11/03/2024 0.53 (L)  0.57 - 1.00 mg/dL Final    Sodium 11/03/2024 137  136 - 145 mmol/L Final    Potassium 11/03/2024 3.8  3.5 - 5.2 mmol/L Final    Chloride 11/03/2024 103  98 - 107 mmol/L Final    CO2 11/03/2024 22.2  22.0 - 29.0 mmol/L Final    Calcium 11/03/2024 9.4  8.6 - 10.5 mg/dL Final    Total Protein 11/03/2024 7.0  6.0 - 8.5 g/dL Final    Albumin 11/03/2024 3.9  3.5 - 5.2 g/dL Final    ALT (SGPT) 11/03/2024 21  1 - 33 U/L Final    AST (SGOT) 11/03/2024 22  1 - 32 U/L Final    Alkaline Phosphatase 11/03/2024 64  39 - 117 U/L Final    Total Bilirubin 11/03/2024 0.4  0.0 - 1.2 mg/dL Final    Globulin 11/03/2024 3.1  gm/dL Final    A/G Ratio 11/03/2024 1.3  g/dL Final    BUN/Creatinine Ratio 11/03/2024 24.5  7.0 - 25.0 Final    Anion Gap 11/03/2024 11.8  5.0 - 15.0 mmol/L Final    eGFR 11/03/2024 104.7  >60.0 mL/min/1.73 Final    Lipase 11/03/2024 23  13 - 60 U/L Final    QT Interval 11/03/2024 376  ms Final    QTC Interval 11/03/2024 447  ms Final    Extra Tube 11/03/2024 Hold for add-ons.   Final    Auto resulted.    Extra Tube 11/03/2024 hold for add-on   Final    Auto resulted    Extra Tube 11/03/2024 Hold for add-ons.   Final    Auto resulted.    Extra Tube 11/03/2024 Hold for add-ons.   Final    Auto resulted.    Extra Tube 11/03/2024 Hold for add-ons.   Final    Auto resulted    WBC 11/03/2024 11.27 (H)  3.40 - 10.80 10*3/mm3 Final    RBC 11/03/2024 4.93  3.77 - 5.28 10*6/mm3 Final    Hemoglobin 11/03/2024 14.5  12.0 - 15.9 g/dL Final    Hematocrit 11/03/2024 44.9  34.0 - 46.6 % Final    MCV 11/03/2024 91.1  79.0 - 97.0 fL Final    MCH 11/03/2024 29.4  26.6 - 33.0 pg Final    MCHC 11/03/2024 32.3  31.5 - 35.7 g/dL Final    RDW 11/03/2024 13.0  12.3 - 15.4 % Final     "RDW-SD 11/03/2024 44.2  37.0 - 54.0 fl Final    MPV 11/03/2024 9.6  6.0 - 12.0 fL Final    Platelets 11/03/2024 240  140 - 450 10*3/mm3 Final    Neutrophil % 11/03/2024 77.2 (H)  42.7 - 76.0 % Final    Lymphocyte % 11/03/2024 14.0 (L)  19.6 - 45.3 % Final    Monocyte % 11/03/2024 7.6  5.0 - 12.0 % Final    Eosinophil % 11/03/2024 0.5  0.3 - 6.2 % Final    Basophil % 11/03/2024 0.4  0.0 - 1.5 % Final    Immature Grans % 11/03/2024 0.3  0.0 - 0.5 % Final    Neutrophils, Absolute 11/03/2024 8.70 (H)  1.70 - 7.00 10*3/mm3 Final    Lymphocytes, Absolute 11/03/2024 1.58  0.70 - 3.10 10*3/mm3 Final    Monocytes, Absolute 11/03/2024 0.86  0.10 - 0.90 10*3/mm3 Final    Eosinophils, Absolute 11/03/2024 0.06  0.00 - 0.40 10*3/mm3 Final    Basophils, Absolute 11/03/2024 0.04  0.00 - 0.20 10*3/mm3 Final    Immature Grans, Absolute 11/03/2024 0.03  0.00 - 0.05 10*3/mm3 Final    nRBC 11/03/2024 0.0  0.0 - 0.2 /100 WBC Final    Color, UA 11/03/2024 Yellow  Yellow, Straw Final    Appearance, UA 11/03/2024 Clear  Clear Final    pH, UA 11/03/2024 6.0  5.0 - 8.0 Final    Specific [place], UA 11/03/2024 1.023  1.005 - 1.030 Final    Glucose, UA 11/03/2024 Negative  Negative Final    Ketones, UA 11/03/2024 Negative  Negative Final    Bilirubin, UA 11/03/2024 Negative  Negative Final    Blood, UA 11/03/2024 Negative  Negative Final    Protein, UA 11/03/2024 Negative  Negative Final    Leuk Esterase, UA 11/03/2024 Negative  Negative Final    Nitrite, UA 11/03/2024 Negative  Negative Final    Urobilinogen, UA 11/03/2024 0.2 E.U./dL  0.2 - 1.0 E.U./dL Final       Lab Results (last 72 hours)       ** No results found for the last 72 hours. **          WBC No results found for: \"WBC\"   HGB No results found for: \"HGB\"   HCT No results found for: \"HCT\"   Platlets No results found for: \"LABPLAT\"     PT/INR:  No results found for: \"PROTIME\"/No results found for: \"INR\"    Sodium No results found for: \"NA\"   Potassium No results found for: \"K\" " "  Chloride No results found for: \"CL\"   Bicarbonate No results found for: \"PLASMABICARB\"   BUN No results found for: \"BUN\"   Creatinine No results found for: \"CREATININE\"   Calcium No results found for: \"CALCIUM\"   Magnesium No results found for: \"MG\"     pH No results found for: \"PHART\"   pO2 No results found for: \"PO2ART\"   pCO2 No results found for: \"CYL6FHE\"   HCO3 No results found for: \"OOG6WRY\"           Radiology Review:   No results found for this or any previous visit.     No results found for this or any previous visit.    No results found for this or any previous visit.    No results found for this or any previous visit.    Results for orders placed during the hospital encounter of 12/05/24    CT Chest With Contrast Diagnostic    Narrative  PROCEDURE: CT CHEST W CONTRAST DIAGNOSTIC-    HISTORY: f/u on pulmonary nodule; R91.1-Solitary pulmonary nodule    COMPARISON: 4/25/2024.    PROCEDURE: Multiple axial CT images were obtained from the thoracic  inlet through the upper abdomen following the administration of  Isovue-300 intravenous contrast. This study was performed with  techniques to keep radiation doses as low as reasonably achievable  (ALARA). Individualized dose reduction techniques using automated  exposure control or adjustment of mA and/or kV according to the patient  size were employed.    FINDINGS:  Soft tissue windows demonstrate no adenopathy within the chest. The  thyroid gland is unremarkable. The heart is normal in size. The aorta is  normal in caliber. There are no pleural or pericardial effusions. Lung  windows reveal no suspicious infiltrate or nodules . The previously  noted groundglass opacities in the left lung are no longer seen. There  is bibasilar atelectasis. In the visualized upper abdomen note is made  of fatty infiltration of the liver. Bone windows reveal no acute osseous  abnormalities.    Impression  Interval resolution of the previously noted groundglass  opacities in the " left lung which were likely infectious or inflammatory  in nature.      This report was finalized on 12/5/2024 9:54 AM by Margret Vogel M.D..    No results found for this or any previous visit.     No results found for this or any previous visit.    No results found for this or any previous visit.    No results found for this or any previous visit.                  Results Review: I reviewed the patient's new clinical results.    Assessment and Plan    Visit Diagnosis:     ICD-10-CM ICD-9-CM   1. Pulmonary nodule  R91.1 793.11   2. Chronic cough  R05.3 786.2   Pulmonary nodule has resolved.  Groundglass haziness has resolved.  Chronic cough is much improved while on Ventolin inhaler.    Patient is stable from pulmonary standpoint.  I will discharge her from my clinic.  Return to clinic as needed.    New Medications:   No orders of the defined types were placed in this encounter.      Discontinued Medications:   There are no discontinued medications.         Follow Up:       Patient was given instructions and counseling regarding her condition. Please see specific information pulled into the AVS if appropriate.       This document has been electronically signed by Tylor Houston MD   July 9, 2025 14:13 EDT    Dictated Utilizing Dragon Dictation: Part of this note may be an electronic transcription/translation of spoken language to printed text using the Dragon Dictation System.

## (undated) DEVICE — CONN Y IRR DISP 1P/U

## (undated) DEVICE — Device

## (undated) DEVICE — Device: Brand: DEFENDO AIR/WATER/SUCTION AND BIOPSY VALVE

## (undated) DEVICE — DEV INFL ALLIANCE2 SYS

## (undated) DEVICE — ESOPHAGEAL BALLOON DILATATION CATHETER: Brand: CRE FIXED WIRE

## (undated) DEVICE — ENDOGATOR AUXILIARY WATER JET CONNECTOR: Brand: ENDOGATOR

## (undated) DEVICE — FRCP BX RADJAW4 NDL 2.8 240CM LG OG BX40

## (undated) DEVICE — THE BITE BLOCK MAXI, LATEX FREE STRAP IS USED TO PROTECT THE ENDOSCOPE INSERTION TUBE FROM BEING BITTEN BY THE PATIENT.